# Patient Record
Sex: FEMALE | Race: BLACK OR AFRICAN AMERICAN | Employment: UNEMPLOYED | URBAN - METROPOLITAN AREA
[De-identification: names, ages, dates, MRNs, and addresses within clinical notes are randomized per-mention and may not be internally consistent; named-entity substitution may affect disease eponyms.]

---

## 2017-08-16 PROBLEM — D61.9 BONE MARROW HYPOCELLULARITY (HCC): Status: ACTIVE | Noted: 2017-08-16

## 2017-08-29 ENCOUNTER — HOSPITAL ENCOUNTER (OUTPATIENT)
Dept: LAB | Age: 52
Discharge: HOME | End: 2017-08-29
Payer: COMMERCIAL

## 2017-08-29 DIAGNOSIS — D64.9 ANEMIA, UNSPECIFIED: ICD-10-CM

## 2017-08-29 DIAGNOSIS — E55.9 VITAMIN D DEFICIENCY: ICD-10-CM

## 2017-08-29 LAB
BASOPHILS # BLD: 0 K/UL (ref 0–0.2)
BASOPHILS NFR BLD: 0 % (ref 0–2)
DIFFERENTIAL METHOD BLD: ABNORMAL
EOSINOPHIL # BLD: 0.1 K/UL (ref 0–0.8)
EOSINOPHIL NFR BLD: 2 % (ref 0.5–7.8)
ERYTHROCYTE [DISTWIDTH] IN BLOOD BY AUTOMATED COUNT: 14.3 % (ref 11.9–14.6)
FERRITIN SERPL-MCNC: 86 NG/ML (ref 8–388)
FOLATE SERPL-MCNC: 19.8 NG/ML (ref 3.1–17.5)
HAPTOGLOB SERPL-MCNC: 145 MG/DL (ref 30–200)
HCT VFR BLD AUTO: 28.9 % (ref 35.8–46.3)
HGB BLD-MCNC: 9.5 G/DL (ref 11.7–15.4)
HGB RETIC QN AUTO: 38 PG (ref 29–35)
IMM RETICS NFR: 26.5 % (ref 3–15.9)
IRON SATN MFR SERPL: 19 %
IRON SERPL-MCNC: 59 UG/DL (ref 35–150)
LDH SERPL L TO P-CCNC: 155 U/L (ref 100–190)
LYMPHOCYTES # BLD: 2.7 K/UL (ref 0.5–4.6)
LYMPHOCYTES NFR BLD: 59 % (ref 13–44)
MCH RBC QN AUTO: 34.4 PG (ref 26.1–32.9)
MCHC RBC AUTO-ENTMCNC: 32.9 G/DL (ref 31.4–35)
MCV RBC AUTO: 104.7 FL (ref 79.6–97.8)
MONOCYTES # BLD: 0.5 K/UL (ref 0.1–1.3)
MONOCYTES NFR BLD: 10 % (ref 4–12)
NEUTS SEG # BLD: 1.4 K/UL (ref 1.7–8.2)
NEUTS SEG NFR BLD: 30 % (ref 43–78)
NRBC # BLD: 0 K/UL (ref 0–0.2)
PLATELET # BLD AUTO: 134 K/UL (ref 150–450)
PMV BLD AUTO: 9.6 FL (ref 10.8–14.1)
RBC # BLD AUTO: 2.76 M/UL (ref 4.05–5.25)
RETICS # AUTO: 0.08 M/UL (ref 0.03–0.1)
RETICS/RBC NFR AUTO: 2.9 % (ref 0.3–2)
T4 FREE SERPL-MCNC: 1.2 NG/DL (ref 0.78–1.46)
TIBC SERPL-MCNC: 318 UG/DL (ref 250–450)
TSH SERPL DL<=0.005 MIU/L-ACNC: 0.3 UIU/ML (ref 0.36–3.74)
VIT B12 SERPL-MCNC: 520 PG/ML (ref 193–986)
WBC # BLD AUTO: 4.6 K/UL (ref 4.3–11.1)

## 2017-08-29 PROCEDURE — 82607 VITAMIN B-12: CPT | Performed by: INTERNAL MEDICINE

## 2017-08-29 PROCEDURE — 86334 IMMUNOFIX E-PHORESIS SERUM: CPT | Performed by: INTERNAL MEDICINE

## 2017-08-29 PROCEDURE — 83010 ASSAY OF HAPTOGLOBIN QUANT: CPT | Performed by: INTERNAL MEDICINE

## 2017-08-29 PROCEDURE — 82728 ASSAY OF FERRITIN: CPT | Performed by: INTERNAL MEDICINE

## 2017-08-29 PROCEDURE — 85046 RETICYTE/HGB CONCENTRATE: CPT | Performed by: INTERNAL MEDICINE

## 2017-08-29 PROCEDURE — 84439 ASSAY OF FREE THYROXINE: CPT | Performed by: INTERNAL MEDICINE

## 2017-08-29 PROCEDURE — 83021 HEMOGLOBIN CHROMOTOGRAPHY: CPT | Performed by: INTERNAL MEDICINE

## 2017-08-29 PROCEDURE — 36415 COLL VENOUS BLD VENIPUNCTURE: CPT | Performed by: INTERNAL MEDICINE

## 2017-08-29 PROCEDURE — 84165 PROTEIN E-PHORESIS SERUM: CPT | Performed by: INTERNAL MEDICINE

## 2017-08-29 PROCEDURE — 84443 ASSAY THYROID STIM HORMONE: CPT | Performed by: INTERNAL MEDICINE

## 2017-08-29 PROCEDURE — 83540 ASSAY OF IRON: CPT | Performed by: INTERNAL MEDICINE

## 2017-08-29 PROCEDURE — 83615 LACTATE (LD) (LDH) ENZYME: CPT | Performed by: INTERNAL MEDICINE

## 2017-08-29 PROCEDURE — 85025 COMPLETE CBC W/AUTO DIFF WBC: CPT | Performed by: INTERNAL MEDICINE

## 2017-08-29 PROCEDURE — 82746 ASSAY OF FOLIC ACID SERUM: CPT | Performed by: INTERNAL MEDICINE

## 2017-08-30 LAB
ALBUMIN SERPL ELPH-MCNC: 3.55 G/DL (ref 3.2–5.6)
ALBUMIN/GLOB SERPL: 0.8 {RATIO}
ALPHA1 GLOB SERPL ELPH-MCNC: 0.33 G/DL (ref 0.1–0.4)
ALPHA2 GLOB SERPL ELPH-MCNC: 0.88 G/DL (ref 0.4–1.2)
B-GLOBULIN SERPL QL ELPH: 1.71 G/DL (ref 0.6–1.3)
GAMMA GLOB MFR SERPL ELPH: 1.62 G/DL (ref 0.5–1.6)
HGB A MFR BLD: 94.3 % (ref 94–98)
HGB A2 MFR BLD COLUMN CHROM: 2.1 % (ref 0.7–3.1)
HGB C MFR BLD: 0 %
HGB F MFR BLD: 3.6 % (ref 0–2)
HGB FRACT BLD-IMP: ABNORMAL
HGB S BLD QL SOLY: NEGATIVE
HGB S MFR BLD: 0 %
IGA SERPL-MCNC: 456 MG/DL (ref 85–499)
IGG SERPL-MCNC: 1819 MG/DL (ref 610–1616)
IGM SERPL-MCNC: 44 MG/DL (ref 35–242)
M PROTEIN SERPL ELPH-MCNC: ABNORMAL G/DL
PROT PATTERN SERPL ELPH-IMP: ABNORMAL
PROT PATTERN SPEC IFE-IMP: ABNORMAL
PROT SERPL-MCNC: 8.1 G/DL (ref 6.3–8.2)

## 2017-09-22 ENCOUNTER — HOSPITAL ENCOUNTER (OUTPATIENT)
Dept: LAB | Age: 52
Discharge: HOME | End: 2017-09-22
Payer: COMMERCIAL

## 2017-09-22 DIAGNOSIS — D64.9 ANEMIA, UNSPECIFIED: ICD-10-CM

## 2017-09-22 LAB
ALBUMIN SERPL-MCNC: 3.4 G/DL (ref 3.5–5)
ALBUMIN/GLOB SERPL: 0.6 {RATIO} (ref 1.2–3.5)
ALP SERPL-CCNC: 69 U/L (ref 50–136)
ALT SERPL-CCNC: 18 U/L (ref 12–65)
ANION GAP SERPL CALC-SCNC: 7 MMOL/L (ref 7–16)
AST SERPL-CCNC: 17 U/L (ref 15–37)
BASOPHILS # BLD: 0 K/UL (ref 0–0.2)
BASOPHILS NFR BLD: 0 % (ref 0–2)
BILIRUB SERPL-MCNC: 0.2 MG/DL (ref 0.2–1.1)
BUN SERPL-MCNC: 16 MG/DL (ref 6–23)
CALCIUM SERPL-MCNC: 9.4 MG/DL (ref 8.3–10.4)
CHLORIDE SERPL-SCNC: 104 MMOL/L (ref 98–107)
CO2 SERPL-SCNC: 27 MMOL/L (ref 21–32)
CREAT SERPL-MCNC: 0.97 MG/DL (ref 0.6–1)
CRP SERPL-MCNC: 1.3 MG/DL (ref 0–0.9)
DIFFERENTIAL METHOD BLD: ABNORMAL
EOSINOPHIL # BLD: 0.1 K/UL (ref 0–0.8)
EOSINOPHIL NFR BLD: 2 % (ref 0.5–7.8)
ERYTHROCYTE [DISTWIDTH] IN BLOOD BY AUTOMATED COUNT: 13.5 % (ref 11.9–14.6)
ERYTHROCYTE [SEDIMENTATION RATE] IN BLOOD: 113 MM/HR (ref 0–30)
GLOBULIN SER CALC-MCNC: 5.6 G/DL (ref 2.3–3.5)
GLUCOSE SERPL-MCNC: 96 MG/DL (ref 65–100)
HCT VFR BLD AUTO: 32.9 % (ref 35.8–46.3)
HGB BLD-MCNC: 10.8 G/DL (ref 11.7–15.4)
LYMPHOCYTES # BLD: 2.5 K/UL (ref 0.5–4.6)
LYMPHOCYTES NFR BLD: 57 % (ref 13–44)
MCH RBC QN AUTO: 33.5 PG (ref 26.1–32.9)
MCHC RBC AUTO-ENTMCNC: 32.8 G/DL (ref 31.4–35)
MCV RBC AUTO: 102.2 FL (ref 79.6–97.8)
MONOCYTES # BLD: 0.4 K/UL (ref 0.1–1.3)
MONOCYTES NFR BLD: 9 % (ref 4–12)
NEUTS SEG # BLD: 1.4 K/UL (ref 1.7–8.2)
NEUTS SEG NFR BLD: 32 % (ref 43–78)
NRBC # BLD: 0 K/UL (ref 0–0.2)
PLATELET # BLD AUTO: 161 K/UL (ref 150–450)
PMV BLD AUTO: 9.2 FL (ref 10.8–14.1)
POTASSIUM SERPL-SCNC: 3.3 MMOL/L (ref 3.5–5.1)
PROT SERPL-MCNC: 9 G/DL (ref 6.3–8.2)
RBC # BLD AUTO: 3.22 M/UL (ref 4.05–5.25)
SODIUM SERPL-SCNC: 138 MMOL/L (ref 136–145)
WBC # BLD AUTO: 4.5 K/UL (ref 4.3–11.1)

## 2017-09-22 PROCEDURE — 80053 COMPREHEN METABOLIC PANEL: CPT | Performed by: INTERNAL MEDICINE

## 2017-09-22 PROCEDURE — 85025 COMPLETE CBC W/AUTO DIFF WBC: CPT | Performed by: INTERNAL MEDICINE

## 2017-09-22 PROCEDURE — 36415 COLL VENOUS BLD VENIPUNCTURE: CPT | Performed by: INTERNAL MEDICINE

## 2017-09-22 PROCEDURE — 86140 C-REACTIVE PROTEIN: CPT | Performed by: INTERNAL MEDICINE

## 2017-09-22 PROCEDURE — 85652 RBC SED RATE AUTOMATED: CPT | Performed by: INTERNAL MEDICINE

## 2017-10-06 ENCOUNTER — HOSPITAL ENCOUNTER (OUTPATIENT)
Dept: CT IMAGING | Age: 52
Discharge: HOME | End: 2017-10-06
Attending: INTERNAL MEDICINE
Payer: COMMERCIAL

## 2017-10-06 VITALS
OXYGEN SATURATION: 100 % | DIASTOLIC BLOOD PRESSURE: 89 MMHG | WEIGHT: 225 LBS | RESPIRATION RATE: 15 BRPM | TEMPERATURE: 98 F | BODY MASS INDEX: 37.49 KG/M2 | HEIGHT: 65 IN | SYSTOLIC BLOOD PRESSURE: 130 MMHG | HEART RATE: 79 BPM

## 2017-10-06 DIAGNOSIS — D64.9 ANEMIA: ICD-10-CM

## 2017-10-06 LAB — GLUCOSE BLD STRIP.AUTO-MCNC: 65 MG/DL (ref 65–100)

## 2017-10-06 PROCEDURE — 88313 SPECIAL STAINS GROUP 2: CPT | Performed by: INTERNAL MEDICINE

## 2017-10-06 PROCEDURE — 74011250636 HC RX REV CODE- 250/636

## 2017-10-06 PROCEDURE — 88312 SPECIAL STAINS GROUP 1: CPT | Performed by: INTERNAL MEDICINE

## 2017-10-06 PROCEDURE — 38221 DX BONE MARROW BIOPSIES: CPT

## 2017-10-06 PROCEDURE — 82962 GLUCOSE BLOOD TEST: CPT

## 2017-10-06 PROCEDURE — 88305 TISSUE EXAM BY PATHOLOGIST: CPT | Performed by: INTERNAL MEDICINE

## 2017-10-06 PROCEDURE — 99152 MOD SED SAME PHYS/QHP 5/>YRS: CPT

## 2017-10-06 PROCEDURE — 74011000250 HC RX REV CODE- 250: Performed by: RADIOLOGY

## 2017-10-06 PROCEDURE — 74011250636 HC RX REV CODE- 250/636: Performed by: RADIOLOGY

## 2017-10-06 PROCEDURE — 88311 DECALCIFY TISSUE: CPT | Performed by: INTERNAL MEDICINE

## 2017-10-06 RX ORDER — SODIUM CHLORIDE 9 MG/ML
150 INJECTION, SOLUTION INTRAVENOUS CONTINUOUS
Status: ACTIVE | OUTPATIENT
Start: 2017-10-06 | End: 2017-10-07

## 2017-10-06 RX ORDER — IBUPROFEN 600 MG/1
600 TABLET ORAL
Status: DISCONTINUED | OUTPATIENT
Start: 2017-10-06 | End: 2017-10-10 | Stop reason: HOSPADM

## 2017-10-06 RX ORDER — SODIUM CHLORIDE 9 MG/ML
25 INJECTION, SOLUTION INTRAVENOUS
Status: COMPLETED | OUTPATIENT
Start: 2017-10-06 | End: 2017-10-06

## 2017-10-06 RX ORDER — DIPHENHYDRAMINE HYDROCHLORIDE 50 MG/ML
50 INJECTION, SOLUTION INTRAMUSCULAR; INTRAVENOUS ONCE
Status: ACTIVE | OUTPATIENT
Start: 2017-10-06 | End: 2017-10-07

## 2017-10-06 RX ORDER — DEXTROSE 50 % IN WATER (D50W) INTRAVENOUS SYRINGE
25 ONCE
Status: COMPLETED | OUTPATIENT
Start: 2017-10-06 | End: 2017-10-06

## 2017-10-06 RX ORDER — HYDROCODONE BITARTRATE AND ACETAMINOPHEN 5; 325 MG/1; MG/1
1 TABLET ORAL
Status: DISCONTINUED | OUTPATIENT
Start: 2017-10-06 | End: 2017-10-10 | Stop reason: HOSPADM

## 2017-10-06 RX ORDER — MIDAZOLAM HYDROCHLORIDE 1 MG/ML
.5-2 INJECTION, SOLUTION INTRAMUSCULAR; INTRAVENOUS
Status: DISCONTINUED | OUTPATIENT
Start: 2017-10-06 | End: 2017-10-06

## 2017-10-06 RX ORDER — LIDOCAINE HYDROCHLORIDE 20 MG/ML
2-10 INJECTION, SOLUTION INFILTRATION; PERINEURAL
Status: COMPLETED | OUTPATIENT
Start: 2017-10-06 | End: 2017-10-06

## 2017-10-06 RX ORDER — FENTANYL CITRATE 50 UG/ML
12.5-1 INJECTION, SOLUTION INTRAMUSCULAR; INTRAVENOUS
Status: DISCONTINUED | OUTPATIENT
Start: 2017-10-06 | End: 2017-10-06

## 2017-10-06 RX ADMIN — SODIUM CHLORIDE 25 ML/HR: 9 INJECTION, SOLUTION INTRAVENOUS at 15:25

## 2017-10-06 RX ADMIN — MIDAZOLAM HYDROCHLORIDE 1 MG: 1 INJECTION, SOLUTION INTRAMUSCULAR; INTRAVENOUS at 15:25

## 2017-10-06 RX ADMIN — FENTANYL CITRATE 50 MCG: 50 INJECTION, SOLUTION INTRAMUSCULAR; INTRAVENOUS at 15:31

## 2017-10-06 RX ADMIN — FENTANYL CITRATE 50 MCG: 50 INJECTION, SOLUTION INTRAMUSCULAR; INTRAVENOUS at 15:25

## 2017-10-06 RX ADMIN — MIDAZOLAM HYDROCHLORIDE 1 MG: 1 INJECTION, SOLUTION INTRAMUSCULAR; INTRAVENOUS at 15:31

## 2017-10-06 RX ADMIN — MIDAZOLAM HYDROCHLORIDE 0.5 MG: 1 INJECTION, SOLUTION INTRAMUSCULAR; INTRAVENOUS at 15:38

## 2017-10-06 RX ADMIN — DEXTROSE MONOHYDRATE 12.5 G: 25 INJECTION, SOLUTION INTRAVENOUS at 15:02

## 2017-10-06 RX ADMIN — LIDOCAINE HYDROCHLORIDE 80 MG: 20 INJECTION, SOLUTION INFILTRATION; PERINEURAL at 15:32

## 2017-10-06 RX ADMIN — FENTANYL CITRATE 25 MCG: 50 INJECTION, SOLUTION INTRAMUSCULAR; INTRAVENOUS at 15:38

## 2017-10-06 NOTE — H&P
Department of Interventional Radiology  [phone]    History and Physical    Patient:  Elvia Antunez MRN:  [id number]  SSN:  [id number]    YOB: 1965  Age:  46 y.o. Sex:  female      Primary Care Provider:  Jamia Aranda DO  Referring Physician:  Georgina Fung MD    Subjective:     Chief Complaint: anemia    History of the Present Illness: The patient is a 46 y.o. female who presents for bone marrow biopsy. Anemia of unknown etiology. Prior biopsies have not been diagnostic. NPO since 7 AM-cheese biscuit. Past Medical History:   Diagnosis Date    Anemia, unspecified 2015    CVID (common variable immunodeficiency) ([facility]     Diagnosed in -had IVIG infusion x 3-no f/u since     Diabetes ([facility]     on meds    Essential hypertension, benign     on meds    Hx of acute pancreatitis     -Admitted to [facility] in [place] Lupus (systemic lupus erythematosus) ([facility]     Diagnosed in     Mixed hyperlipidemia     Obesity (BMI 30-39.9) 2016    Unspecified hypothyroidism 3/1/2015    on meds    Vitamin D deficiency 3/1/2015     Past Surgical History:   Procedure Laterality Date    HX  SECTION  ,     HX CHOLECYSTECTOMY      HX COLONOSCOPY  7/3/15 and     at age 43   [address]      for excessive bleeding and bso    HX OTHER SURGICAL  3/20/2015    bone marrow        Review of Systems:    Pertinent items are noted in the History of Present Illness. Current Outpatient Prescriptions   Medication Sig    lisinopril (PRINIVIL, ZESTRIL) 5 mg tablet Take 1 Tab by mouth daily.     metFORMIN ER (GLUCOPHAGE XR) 500 mg tablet 3 tabs po every day w/ supper    levothyroxine (SYNTHROID) 75 mcg tablet TAKE ONE TABLET BY MOUTH ONCE DAILY BEFORE BREAKFAST     Current Facility-Administered Medications   Medication Dose Route Frequency    midazolam (VERSED) injection 0.5-2 mg  0.5-2 mg IntraVENous Multiple  fentaNYL citrate (PF) injection 12.5-100 mcg  12.5-100 mcg IntraVENous Multiple    0.9% sodium chloride infusion  25 mL/hr IntraVENous RAD ONCE    lidocaine (XYLOCAINE) 20 mg/mL (2 %) injection  mg  2-10 mL SubCUTAneous RAD ONCE    diphenhydrAMINE (BENADRYL) injection 50 mg  50 mg IntraVENous ONCE        Allergies   Allergen Reactions    Dilaudid [Hydromorphone] Other (comments)     Stroke like symptoms       Family History   Problem Relation Age of Onset    Hypertension Mother     Heart Disease Mother      CHF   [facility] Cancer Father      lung    Diabetes Sister     Hypertension Sister     Cancer Sister 52     Breast     Social History   Substance Use Topics    Smoking status: Never Smoker    Smokeless tobacco: Never Used    Alcohol use No        Objective:       Physical Examination:    Vitals:    10/06/17 1442 10/06/17 1452   BP: 114/79    Pulse: 82    Resp: 17    Temp: 98 °F (36.7 °C)    SpO2: 100%    Weight:  102.1 kg (225 lb)   Height:  5' 5\" (1.651 m)     Blood pressure 114/79, pulse 82, temperature 98 °F (36.7 °C), resp. rate 17, height 5' 5\" (1.651 m), weight 102.1 kg (225 lb), SpO2 100 %.   HEART: regular rate and rhythm  LUNG: clear to auscultation bilaterally  ABDOMEN: normal findings: soft, non-tender, obese  EXTREMITIES: warm, trace LE edema    Laboratory:     Lab Results   Component Value Date/Time    Sodium 138 09/22/2017 08:28 AM    Sodium 141 08/11/2017 08:58 AM    Potassium 3.3 09/22/2017 08:28 AM    Potassium 3.6 08/11/2017 08:58 AM    Chloride 104 09/22/2017 08:28 AM    Chloride 101 08/11/2017 08:58 AM    CO2 27 09/22/2017 08:28 AM    CO2 22 08/11/2017 08:58 AM    Anion gap 7 09/22/2017 08:28 AM    Anion gap 9 08/11/2015 09:10 AM    Glucose 96 09/22/2017 08:28 AM    Glucose 89 08/11/2017 08:58 AM    BUN 16 09/22/2017 08:28 AM    BUN 14 08/11/2017 08:58 AM    Creatinine 0.97 09/22/2017 08:28 AM    Creatinine 0.88 08/11/2017 08:58 AM    GFR est AA >60 09/22/2017 08:28 AM    GFR est AA 87 08/11/2017 08:58 AM    GFR est non-AA >60 09/22/2017 08:28 AM    GFR est non-AA 76 08/11/2017 08:58 AM    Calcium 9.4 09/22/2017 08:28 AM    Calcium 9.7 08/11/2017 08:58 AM    Magnesium 1.9 05/05/2015 02:44 PM    Albumin 3.4 09/22/2017 08:28 AM    Albumin 3.6 08/15/2016 11:24 AM    Protein, total 9.0 09/22/2017 08:28 AM    Protein, total 8.1 08/29/2017 11:29 AM    Globulin 5.6 09/22/2017 08:28 AM    Globulin 5.1 08/11/2015 09:10 AM    A-G Ratio 0.6 09/22/2017 08:28 AM    A-G Ratio 0.8 08/29/2017 11:29 AM    AST (SGOT) 17 09/22/2017 08:28 AM    AST (SGOT) 18 08/15/2016 11:24 AM    ALT (SGPT) 18 09/22/2017 08:28 AM    ALT (SGPT) 14 08/15/2016 11:24 AM     Lab Results   Component Value Date/Time    WBC 4.5 09/22/2017 08:28 AM    WBC 4.6 08/29/2017 11:29 AM    HGB 10.8 09/22/2017 08:28 AM    HGB 9.5 08/29/2017 11:29 AM    HCT 32.9 09/22/2017 08:28 AM    HCT 28.9 08/29/2017 11:29 AM    PLATELET 397 98/75/8852 08:28 AM    PLATELET 826 32/17/3824 11:29 AM     Lab Results   Component Value Date/Time    aPTT 24.3 03/05/2015 04:18 PM    Prothrombin time 9.9 03/05/2015 04:18 PM    INR 1.0 03/05/2015 04:18 PM       Assessment:     anemia    Plan:     Planned Procedure:  Bone marrow biopsy    Risks, benefits, and alternatives reviewed with patient and she agrees to proceed with the procedure.       Signed By: Graham Calderon PA-C     October 6, 2017

## 2017-10-06 NOTE — DISCHARGE INSTRUCTIONS
[address]  Department of Interventional Radiology  [facility]  [phone] Office  [phone] Fax    BIOPSY DISCHARGE INSTRUCTIONS    General Instructions:     A biopsy is the removal of a small piece of tissue for microscopic examination or testing. Healthy tissue can be obtained for the purpose of tissue-type matching for transplants. Unhealthy tissues are more commonly biopsied to diagnose disease. Lung Biopsy:     A needle lung biopsy is performed when there is a mass discovered in the lung area. The most serious risk is infection, bleeding, and pneumothorax (a collapsed lung). Signs of pneumothorax include shortness of breath, rapid heart rate, and blueness of the skin. If any of these occur, call 911. Liver Biopsy: This test helps detect cancer, infections, and the cause of an enlargement of the liver or elevated liver enzymes. It also helps to diagnose a number of liver diseases. The pain associated with the procedure may be felt in the shoulder. The risks include internal bleeding, pneumothorax, and injury to the surrounding organs. Renal Biopsy: This procedure is sometimes done to evaluate a transplanted kidney. It is also used to evaluate unexplained decrease in kidney function, blood, or protein in the urine. You may see bright red blood in the urine the first 24 hours after the test. If the bleeding lasts longer, you need to call your doctor. There is a risk of infection and bleeding into the muscle, which may cause soreness. Spinal Biopsy: This test is sometimes done in conjunction with other procedures. Your back will be sore, as we are taking a small sample of bone, which is slightly more difficult to sample than tissue. General Biopsy:     A mass can grow in any area of the body, and we are taking a specimen as ordered by your doctor. The risks are the same.  They include bleeding, pain, and infection. Home Care Instructions: You may resume your regular diet and medication regimen. Do not drink alcohol, drive, or make any important legal decisions in the next 24 hours. Do not lift anything heavier than a gallon of milk until the soreness goes away. You may use over the counter acetaminophen or ibuprofen for the soreness. You may apply an ice pack to the affected area for 20-30 minutes at time for the first 24 hours. After that, you may apply a heat pack. Call If: You should call your Physician and/or the Radiology Nurse if you have any questions or concerns about the biopsy site. Call if you should have increased pain, fever, redness, drainage, or bleeding more than a small spot on the bandage. Follow-Up Instructions: Please see your ordering doctor as he/she has requested. To Reach Us: If you have any questions about your procedure, please call the Interventional Radiology department at [phone]. After business hours (5pm) and weekends, call the answering service at [phone] and ask for the Radiologist on call to be paged. Interventional Radiology General Nurse Discharge    After general anesthesia or intravenous sedation, for 24 hours or while taking prescription Narcotics:  · Limit your activities  · Do not drive and operate hazardous machinery  · Do not make important personal or business decisions  · Do  not drink alcoholic beverages  · If you have not urinated within 8 hours after discharge, please contact your surgeon on call. * Please give a list of your current medications to your Primary Care Provider. * Please update this list whenever your medications are discontinued, doses are     changed, or new medications (including over-the-counter products) are added. * Please carry medication information at all times in case of emergency situations.     These are general instructions for a healthy lifestyle:    No smoking/ No tobacco products/ Avoid exposure to second hand smoke  Surgeon General's Warning:  Quitting smoking now greatly reduces serious risk to your health. Obesity, smoking, and sedentary lifestyle greatly increases your risk for illness  A healthy diet, regular physical exercise & weight monitoring are important for maintaining a healthy lifestyle    You may be retaining fluid if you have a history of heart failure or if you experience any of the following symptoms:  Weight gain of 3 pounds or more overnight or 5 pounds in a week, increased swelling in our hands or feet or shortness of breath while lying flat in bed. Please call your doctor as soon as you notice any of these symptoms; do not wait until your next office visit. Recognize signs and symptoms of STROKE:  F-face looks uneven    A-arms unable to move or move unevenly    S-speech slurred or non-existent    T-time-call 911 as soon as signs and symptoms begin-DO NOT go       Back to bed or wait to see if you get better-TIME IS BRAIN. Patient Signature:  Date: 10/6/2017  Discharging Nurse:  Arlene Kerr RN

## 2017-10-06 NOTE — PROGRESS NOTES
Recovery period without difficulty. Pt alert and oriented and denies pain. Dressing is clean, dry, and intact. Reviewed discharge instructions with patient and friend, both verbalized understanding. [facility] Vital signs and Lorie score completed.

## 2017-10-06 NOTE — PROCEDURES
Interventional Radiology Brief Procedure Note    Patient: Viviane Epperson MRN: [id number]  SSN: [id number]    YOB: 1965  Age: 46 y.o. Sex: female      Date of Procedure: 10/6/2017    Pre-Procedure Diagnosis: Anemia. Post-Procedure Diagnosis: SAME    Procedure(s): Image Guided Biopsy    Brief Description of Procedure: Right iliac bone marrow aspiration and core biopsy. Performed By: Michael Clements MD     Assistants: None    Anesthesia: Moderate Sedation    Estimated Blood Loss: Less than 10ml    Specimens: Pathology    Implants: None    Findings: The aspirate clotted immediately. Two needle placements required. Complications: None    Recommendations: 1 hour bedrest.       Follow Up: Dr Joe Crawford.       Signed By: Michael Clements MD     October 6, 2017

## 2017-10-06 NOTE — IP AVS SNAPSHOT
[address] 
[phone] Patient: Zeny Jason MRN: [id number] :1965 Current Discharge Medication List  
  
ASK your doctor about these medications Dose & Instructions Dispensing Information Comments Morning Noon Evening Bedtime  
 levothyroxine 75 mcg tablet Commonly known as:  SYNTHROID Your last dose was: Your next dose is: TAKE ONE TABLET BY MOUTH ONCE DAILY BEFORE BREAKFAST Quantity:  30 Tab Refills:  2  
     
   
   
   
  
 lisinopril 5 mg tablet Commonly known as:  Rosanna Wick Your last dose was: Your next dose is:    
   
   
 Dose:  5 mg Take 1 Tab by mouth daily. Quantity:  90 Tab Refills:  2  
     
   
   
   
  
 metFORMIN  mg tablet Commonly known as:  GLUCOPHAGE XR Your last dose was: Your next dose is:    
   
   
 3 tabs po every day w/ supper Quantity:  90 Tab Refills:  2

## 2017-10-06 NOTE — IP AVS SNAPSHOT
Lauraalfie Gutierrezgulshan 
 
 
 [address] 
[phone] Patient: Sanam Davis MRN: [id number] :1965 You are allergic to the following Allergen Reactions Dilaudid (Hydromorphone) Other (comments) Stroke like symptoms Recent Documentation Height Weight BMI OB Status Smoking Status 1.651 m 102.1 kg 37.44 kg/m2 Hysterectomy Never Smoker Unresulted Labs Order Current Status BONE MARROW PREP & FLORES STAIN Collected (10/06/17 1439) CBC WITH AUTOMATED DIFF Collected (10/06/17 1440) Emergency Contacts Name Discharge Info Relation Home Work Mobile Bonny Diaz  Other Relative [6] [phone] About your hospitalization You were admitted on:  2017 You last received care in the:  [facility] RADIOLOGY CT SCAN You were discharged on:  2017 Unit phone number:  [phone] Why you were hospitalized Your primary diagnosis was:  Not on File Providers Seen During Your Hospitalizations Provider Role Specialty Primary office phone General Shaye MD Attending Provider Hematology [phone] Your Primary Care Physician (PCP) Primary Care Physician Office Phone Office Fax Theta Mcardle, [facility] [phone] Follow-up Information None Your Appointments   8:00 AM EDT Office Visit with Kranthi Carey DO  
[facility] ([address])  [place]   
[phone]   9:30 AM EDT  
LAB with [address]  
[address] [facility] OUTREACH INSURANCE 1 Rae Almazan [address] [place] [address]  
[phone]  10:00 AM EDT Follow Up with MD Coco Deshpande Hematology and Oncology [facility]) KEVIN/ Vamsi Perez [address] [place] [address]  
[phone] Saturday November 11, 2017  7:15 AM EST  
NATI MAMMO SCREENING with [facility] NATI BI ROOM 1 [address] [facility] ([address]) [address] [place] [address]  
[phone] ***** NOTE: Appointments for the Mobile Mammography UNIT CANNOT be made on My Chart *****  PATIENT ARRIVAL - Please report 30 minutes early to check in. GENERAL INSTRUCTIONS -  On the day of your exam do not use any bath powder, deodorant or lotions on the chest or armpit area. Wear two-piece outfit for ease of changing. Allow at least 1 hour for test.  
  
    
  
  
 
  
  
  
Current Discharge Medication List  
  
ASK your doctor about these medications Dose & Instructions Dispensing Information Comments Morning Noon Evening Bedtime  
 levothyroxine 75 mcg tablet Commonly known as:  SYNTHROID Your last dose was: Your next dose is: TAKE ONE TABLET BY MOUTH ONCE DAILY BEFORE BREAKFAST Quantity:  30 Tab Refills:  2  
     
   
   
   
  
 lisinopril 5 mg tablet Commonly known as:  Jaramillo Dontae Your last dose was: Your next dose is:    
   
   
 Dose:  5 mg Take 1 Tab by mouth daily. Quantity:  90 Tab Refills:  2  
     
   
   
   
  
 metFORMIN  mg tablet Commonly known as:  GLUCOPHAGE XR Your last dose was: Your next dose is:    
   
   
 3 tabs po every day w/ supper Quantity:  90 Tab Refills:  2 Discharge Instructions [facility] [address] Department of Interventional Radiology [facility] 
[phone] Office 
[phone] Fax BIOPSY DISCHARGE INSTRUCTIONS General Instructions:    A biopsy is the removal of a small piece of tissue for microscopic examination or testing. Healthy tissue can be obtained for the purpose of tissue-type matching for transplants. Unhealthy tissues are more commonly biopsied to diagnose disease. Lung Biopsy: A needle lung biopsy is performed when there is a mass discovered in the lung area. The most serious risk is infection, bleeding, and pneumothorax (a collapsed lung). Signs of pneumothorax include shortness of breath, rapid heart rate, and blueness of the skin. If any of these occur, call 911. Liver Biopsy: This test helps detect cancer, infections, and the cause of an enlargement of the liver or elevated liver enzymes. It also helps to diagnose a number of liver diseases. The pain associated with the procedure may be felt in the shoulder. The risks include internal bleeding, pneumothorax, and injury to the surrounding organs. Renal Biopsy: This procedure is sometimes done to evaluate a transplanted kidney. It is also used to evaluate unexplained decrease in kidney function, blood, or protein in the urine. You may see bright red blood in the urine the first 24 hours after the test. If the bleeding lasts longer, you need to call your doctor. There is a risk of infection and bleeding into the muscle, which may cause soreness. Spinal Biopsy: This test is sometimes done in conjunction with other procedures. Your back will be sore, as we are taking a small sample of bone, which is slightly more difficult to sample than tissue. General Biopsy: 
   A mass can grow in any area of the body, and we are taking a specimen as ordered by your doctor. The risks are the same. They include bleeding, pain, and infection. Home Care Instructions: You may resume your regular diet and medication regimen. Do not drink alcohol, drive, or make any important legal decisions in the next 24 hours. Do not lift anything heavier than a gallon of milk until the soreness goes away.  You may use over the counter acetaminophen or ibuprofen for the soreness. You may apply an ice pack to the affected area for 20-30 minutes at time for the first 24 hours. After that, you may apply a heat pack. Call If: You should call your Physician and/or the Radiology Nurse if you have any questions or concerns about the biopsy site. Call if you should have increased pain, fever, redness, drainage, or bleeding more than a small spot on the bandage. Follow-Up Instructions: Please see your ordering doctor as he/she has requested. To Reach Us: If you have any questions about your procedure, please call the Interventional Radiology department at [phone]. After business hours (5pm) and weekends, call the answering service at [phone] and ask for the Radiologist on call to be paged. Interventional Radiology General Nurse Discharge After general anesthesia or intravenous sedation, for 24 hours or while taking prescription Narcotics: · Limit your activities · Do not drive and operate hazardous machinery · Do not make important personal or business decisions · Do  not drink alcoholic beverages · If you have not urinated within 8 hours after discharge, please contact your surgeon on call. * Please give a list of your current medications to your Primary Care Provider. * Please update this list whenever your medications are discontinued, doses are 
   changed, or new medications (including over-the-counter products) are added. * Please carry medication information at all times in case of emergency situations. These are general instructions for a healthy lifestyle: No smoking/ No tobacco products/ Avoid exposure to second hand smoke Surgeon General's Warning:  Quitting smoking now greatly reduces serious risk to your health. Obesity, smoking, and sedentary lifestyle greatly increases your risk for illness A healthy diet, regular physical exercise & weight monitoring are important for maintaining a healthy lifestyle You may be retaining fluid if you have a history of heart failure or if you experience any of the following symptoms:  Weight gain of 3 pounds or more overnight or 5 pounds in a week, increased swelling in our hands or feet or shortness of breath while lying flat in bed. Please call your doctor as soon as you notice any of these symptoms; do not wait until your next office visit. Recognize signs and symptoms of STROKE: 
F-face looks uneven A-arms unable to move or move unevenly S-speech slurred or non-existent T-time-call 911 as soon as signs and symptoms begin-DO NOT go Back to bed or wait to see if you get better-TIME IS BRAIN. Patient Signature: 
Date: 10/6/2017 Discharging Nurse: Garcia Caldwell RN Discharge Orders None [facility]! Dear Jackeline Needs: Thank you for requesting a [employer] account. Our records indicate that you already have an active [employer] account. You can access your account anytime at [email or web address] Did you know that you can access your hospital and ER discharge instructions at any time in [employer]? You can also review all of your test results from your hospital stay or ER visit. Additional Information If you have questions, please visit the Frequently Asked Questions section of the [employer] website at [email or web address] Remember, [employer] is NOT to be used for urgent needs. For medical emergencies, dial 911. Now available from your iPhone and Android! General Information Please provide this summary of care documentation to your next provider. Patient Signature:  ____________________________________________________________ Date:  ____________________________________________________________  
  
Sylvia Nelson Provider Signature:  ____________________________________________________________ Date:  ____________________________________________________________

## 2017-10-17 LAB — BONE MARROW PREP & W,BMA: NORMAL

## 2017-10-26 ENCOUNTER — HOSPITAL ENCOUNTER (OUTPATIENT)
Dept: LAB | Age: 52
Discharge: HOME | End: 2017-10-26
Payer: COMMERCIAL

## 2017-10-26 DIAGNOSIS — D61.9 BONE MARROW HYPOCELLULARITY (HCC): ICD-10-CM

## 2017-10-26 LAB
ALBUMIN SERPL-MCNC: 3.5 G/DL (ref 3.5–5)
ALBUMIN/GLOB SERPL: 0.6 {RATIO} (ref 1.2–3.5)
ALP SERPL-CCNC: 74 U/L (ref 50–136)
ALT SERPL-CCNC: 18 U/L (ref 12–65)
ANION GAP SERPL CALC-SCNC: 9 MMOL/L (ref 7–16)
AST SERPL-CCNC: 19 U/L (ref 15–37)
BASOPHILS # BLD: 0 K/UL (ref 0–0.2)
BASOPHILS NFR BLD: 0 % (ref 0–2)
BILIRUB SERPL-MCNC: 0.4 MG/DL (ref 0.2–1.1)
BUN SERPL-MCNC: 17 MG/DL (ref 6–23)
CALCIUM SERPL-MCNC: 9.8 MG/DL (ref 8.3–10.4)
CHLORIDE SERPL-SCNC: 103 MMOL/L (ref 98–107)
CO2 SERPL-SCNC: 24 MMOL/L (ref 21–32)
CREAT SERPL-MCNC: 0.95 MG/DL (ref 0.6–1)
DIFFERENTIAL METHOD BLD: ABNORMAL
EOSINOPHIL # BLD: 0 K/UL (ref 0–0.8)
EOSINOPHIL NFR BLD: 1 % (ref 0.5–7.8)
ERYTHROCYTE [DISTWIDTH] IN BLOOD BY AUTOMATED COUNT: 13.6 % (ref 11.9–14.6)
GLOBULIN SER CALC-MCNC: 5.6 G/DL (ref 2.3–3.5)
GLUCOSE SERPL-MCNC: 97 MG/DL (ref 65–100)
HCT VFR BLD AUTO: 32.4 % (ref 35.8–46.3)
HGB BLD-MCNC: 11 G/DL (ref 11.7–15.4)
LYMPHOCYTES # BLD: 2.7 K/UL (ref 0.5–4.6)
LYMPHOCYTES NFR BLD: 53 % (ref 13–44)
MCH RBC QN AUTO: 34 PG (ref 26.1–32.9)
MCHC RBC AUTO-ENTMCNC: 34 G/DL (ref 31.4–35)
MCV RBC AUTO: 100 FL (ref 79.6–97.8)
MONOCYTES # BLD: 0.5 K/UL (ref 0.1–1.3)
MONOCYTES NFR BLD: 10 % (ref 4–12)
NEUTS SEG # BLD: 1.9 K/UL (ref 1.7–8.2)
NEUTS SEG NFR BLD: 36 % (ref 43–78)
NRBC # BLD: 0 K/UL (ref 0–0.2)
PLATELET # BLD AUTO: 175 K/UL (ref 150–450)
PMV BLD AUTO: 9.3 FL (ref 10.8–14.1)
POTASSIUM SERPL-SCNC: 3.9 MMOL/L (ref 3.5–5.1)
PROT SERPL-MCNC: 9.1 G/DL (ref 6.3–8.2)
RBC # BLD AUTO: 3.24 M/UL (ref 4.05–5.25)
SODIUM SERPL-SCNC: 136 MMOL/L (ref 136–145)
WBC # BLD AUTO: 5.2 K/UL (ref 4.3–11.1)

## 2017-10-26 PROCEDURE — 80053 COMPREHEN METABOLIC PANEL: CPT | Performed by: INTERNAL MEDICINE

## 2017-10-26 PROCEDURE — 85025 COMPLETE CBC W/AUTO DIFF WBC: CPT | Performed by: INTERNAL MEDICINE

## 2017-10-26 PROCEDURE — 36415 COLL VENOUS BLD VENIPUNCTURE: CPT | Performed by: INTERNAL MEDICINE

## 2017-11-11 ENCOUNTER — HOSPITAL ENCOUNTER (OUTPATIENT)
Dept: MAMMOGRAPHY | Age: 52
Discharge: HOME | End: 2017-11-11
Attending: FAMILY MEDICINE
Payer: COMMERCIAL

## 2017-11-11 DIAGNOSIS — Z00.00 ROUTINE HEALTH MAINTENANCE: ICD-10-CM

## 2017-11-11 PROCEDURE — 77067 SCR MAMMO BI INCL CAD: CPT

## 2018-04-12 ENCOUNTER — APPOINTMENT (OUTPATIENT)
Dept: GENERAL RADIOLOGY | Age: 53
End: 2018-04-12
Attending: EMERGENCY MEDICINE
Payer: SELF-PAY

## 2018-04-12 ENCOUNTER — HOSPITAL ENCOUNTER (EMERGENCY)
Age: 53
Discharge: HOME | End: 2018-04-12
Attending: EMERGENCY MEDICINE
Payer: SELF-PAY

## 2018-04-12 VITALS
WEIGHT: 235 LBS | DIASTOLIC BLOOD PRESSURE: 85 MMHG | TEMPERATURE: 97.9 F | HEART RATE: 77 BPM | HEIGHT: 65 IN | RESPIRATION RATE: 16 BRPM | BODY MASS INDEX: 39.15 KG/M2 | SYSTOLIC BLOOD PRESSURE: 131 MMHG | OXYGEN SATURATION: 100 %
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
## 2018-04-12 DIAGNOSIS — M54.2 NECK PAIN: ICD-10-CM

## 2018-04-12 DIAGNOSIS — M54.50 ACUTE MIDLINE LOW BACK PAIN WITHOUT SCIATICA: ICD-10-CM

## 2018-04-12 LAB — GLUCOSE BLD STRIP.AUTO-MCNC: 113 MG/DL (ref 65–100)

## 2018-04-12 PROCEDURE — 74011250637 HC RX REV CODE- 250/637: Performed by: EMERGENCY MEDICINE

## 2018-04-12 PROCEDURE — 99283 EMERGENCY DEPT VISIT LOW MDM: CPT | Performed by: EMERGENCY MEDICINE

## 2018-04-12 PROCEDURE — 72040 X-RAY EXAM NECK SPINE 2-3 VW: CPT

## 2018-04-12 PROCEDURE — 82962 GLUCOSE BLOOD TEST: CPT

## 2018-04-12 PROCEDURE — 72100 X-RAY EXAM L-S SPINE 2/3 VWS: CPT

## 2018-04-12 RX ORDER — NAPROXEN 375 MG/1
375 TABLET ORAL 2 TIMES DAILY WITH MEALS
Qty: 14 TAB | Refills: 0 | Status: SHIPPED | OUTPATIENT
Start: 2018-04-12 | End: 2018-04-19

## 2018-04-12 RX ORDER — CYCLOBENZAPRINE HCL 5 MG
5 TABLET ORAL 2 TIMES DAILY
Qty: 10 TAB | Refills: 0 | Status: SHIPPED | OUTPATIENT
Start: 2018-04-12 | End: 2018-04-17

## 2018-04-12 RX ORDER — KETOROLAC TROMETHAMINE 10 MG/1
10 TABLET, FILM COATED ORAL
Status: COMPLETED | OUTPATIENT
Start: 2018-04-12 | End: 2018-04-12

## 2018-04-12 RX ADMIN — KETOROLAC TROMETHAMINE 10 MG: 10 TABLET, FILM COATED ORAL at 10:49
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
## 2018-04-12 NOTE — LETTER
NOTIFICATION RETURN TO WORK / SCHOOL 
 
4/12/2018 11:38 AM 
 
Ms. Charissa Vargas [address] [place] [address] To Whom It May Concern: 
 
Charissa Vargas is currently under the care of [facility] EMERGENCY DEPT. She will return to work/school on: 4/14/18 If there are questions or concerns please have the patient contact our office. Sincerely, Alanis Morton MD
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
## 2018-11-17 ENCOUNTER — HOSPITAL ENCOUNTER (OUTPATIENT)
Dept: MAMMOGRAPHY | Age: 53
Discharge: HOME | End: 2018-11-17
Attending: INTERNAL MEDICINE
Payer: COMMERCIAL

## 2018-11-17 DIAGNOSIS — Z12.31 VISIT FOR SCREENING MAMMOGRAM: ICD-10-CM

## 2018-11-17 PROCEDURE — 77067 SCR MAMMO BI INCL CAD: CPT

## 2019-04-02 PROBLEM — E66.01 OBESITY, MORBID (HCC): Status: ACTIVE | Noted: 2019-04-02

## 2019-04-23 ENCOUNTER — HOSPITAL ENCOUNTER (OUTPATIENT)
Dept: LAB | Age: 54
Discharge: HOME | End: 2019-04-23
Payer: COMMERCIAL

## 2019-04-23 DIAGNOSIS — D61.9 BONE MARROW HYPOCELLULARITY (HCC): ICD-10-CM

## 2019-04-23 DIAGNOSIS — R53.83 FATIGUE, UNSPECIFIED TYPE: ICD-10-CM

## 2019-04-23 LAB
ALBUMIN SERPL-MCNC: 3.3 G/DL (ref 3.5–5)
ALBUMIN/GLOB SERPL: 0.6 {RATIO} (ref 1.2–3.5)
ALP SERPL-CCNC: 83 U/L (ref 50–136)
ALT SERPL-CCNC: 19 U/L (ref 12–65)
ANION GAP SERPL CALC-SCNC: 7 MMOL/L (ref 7–16)
AST SERPL-CCNC: 20 U/L (ref 15–37)
BASOPHILS # BLD: 0 K/UL (ref 0–0.2)
BASOPHILS NFR BLD: 1 % (ref 0–2)
BILIRUB SERPL-MCNC: 0.2 MG/DL (ref 0.2–1.1)
BUN SERPL-MCNC: 14 MG/DL (ref 6–23)
CALCIUM SERPL-MCNC: 9.4 MG/DL (ref 8.3–10.4)
CHLORIDE SERPL-SCNC: 107 MMOL/L (ref 98–107)
CO2 SERPL-SCNC: 26 MMOL/L (ref 21–32)
CREAT SERPL-MCNC: 0.99 MG/DL (ref 0.6–1)
DIFFERENTIAL METHOD BLD: ABNORMAL
EOSINOPHIL # BLD: 0 K/UL (ref 0–0.8)
EOSINOPHIL NFR BLD: 1 % (ref 0.5–7.8)
ERYTHROCYTE [DISTWIDTH] IN BLOOD BY AUTOMATED COUNT: 13.7 % (ref 11.9–14.6)
GLOBULIN SER CALC-MCNC: 5.2 G/DL (ref 2.3–3.5)
GLUCOSE SERPL-MCNC: 105 MG/DL (ref 65–100)
HCT VFR BLD AUTO: 33.3 % (ref 35.8–46.3)
HGB BLD-MCNC: 10.8 G/DL (ref 11.7–15.4)
IMM GRANULOCYTES # BLD AUTO: 0 K/UL (ref 0–0.5)
IMM GRANULOCYTES NFR BLD AUTO: 1 % (ref 0–5)
LYMPHOCYTES # BLD: 2.5 K/UL (ref 0.5–4.6)
LYMPHOCYTES NFR BLD: 58 % (ref 13–44)
MCH RBC QN AUTO: 32.3 PG (ref 26.1–32.9)
MCHC RBC AUTO-ENTMCNC: 32.4 G/DL (ref 31.4–35)
MCV RBC AUTO: 99.7 FL (ref 79.6–97.8)
MONOCYTES # BLD: 0.4 K/UL (ref 0.1–1.3)
MONOCYTES NFR BLD: 9 % (ref 4–12)
NEUTS SEG # BLD: 1.4 K/UL (ref 1.7–8.2)
NEUTS SEG NFR BLD: 31 % (ref 43–78)
NRBC # BLD: 0 K/UL (ref 0–0.2)
PLATELET # BLD AUTO: 162 K/UL (ref 150–450)
PMV BLD AUTO: 9.4 FL (ref 9.4–12.3)
POTASSIUM SERPL-SCNC: 3.7 MMOL/L (ref 3.5–5.1)
PROT SERPL-MCNC: 8.5 G/DL (ref 6.3–8.2)
RBC # BLD AUTO: 3.34 M/UL (ref 4.05–5.25)
SODIUM SERPL-SCNC: 140 MMOL/L (ref 136–145)
T4 FREE SERPL-MCNC: 0.8 NG/DL (ref 0.78–1.46)
WBC # BLD AUTO: 4.4 K/UL (ref 4.3–11.1)

## 2019-04-23 PROCEDURE — 80053 COMPREHEN METABOLIC PANEL: CPT

## 2019-04-23 PROCEDURE — 83921 ORGANIC ACID SINGLE QUANT: CPT

## 2019-04-23 PROCEDURE — 88184 FLOWCYTOMETRY/ TC 1 MARKER: CPT

## 2019-04-23 PROCEDURE — 85025 COMPLETE CBC W/AUTO DIFF WBC: CPT

## 2019-04-23 PROCEDURE — 84439 ASSAY OF FREE THYROXINE: CPT

## 2019-04-23 PROCEDURE — 36415 COLL VENOUS BLD VENIPUNCTURE: CPT

## 2019-04-23 PROCEDURE — 83090 ASSAY OF HOMOCYSTEINE: CPT

## 2019-04-23 PROCEDURE — 88185 FLOWCYTOMETRY/TC ADD-ON: CPT

## 2019-04-24 LAB — HCYS SERPL-SCNC: 8.6 UMOL/L (ref 0–15)

## 2019-04-26 LAB
Lab: NORMAL
METHYLMALONATE SERPL-SCNC: 137 NMOL/L (ref 0–378)

## 2019-04-29 PROBLEM — R03.0 BLOOD PRESSURE ELEVATED WITHOUT HISTORY OF HTN: Status: ACTIVE | Noted: 2019-04-29

## 2019-04-29 PROBLEM — Z00.00 PHYSICAL EXAM, ANNUAL: Status: ACTIVE | Noted: 2019-04-29

## 2019-04-29 PROBLEM — Z13.820 OSTEOPOROSIS SCREENING: Status: ACTIVE | Noted: 2019-04-29

## 2019-04-29 PROBLEM — Z23 ENCOUNTER FOR IMMUNIZATION: Status: ACTIVE | Noted: 2019-04-29

## 2019-04-29 PROBLEM — E66.01 MORBID OBESITY (HCC): Status: ACTIVE | Noted: 2019-04-29

## 2019-04-29 PROBLEM — Z12.39 BREAST CANCER SCREENING: Status: ACTIVE | Noted: 2019-04-29

## 2019-04-29 LAB
FLOW CYTOMETRY, FBTC1: NORMAL
SPECIMEN SOURCE: NORMAL
TEST ORDERED:: NORMAL

## 2019-05-23 ENCOUNTER — HOSPITAL ENCOUNTER (OUTPATIENT)
Dept: LAB | Age: 54
Discharge: HOME | End: 2019-05-23
Payer: COMMERCIAL

## 2019-05-23 DIAGNOSIS — D51.9 ANEMIA DUE TO VITAMIN B12 DEFICIENCY, UNSPECIFIED B12 DEFICIENCY TYPE: ICD-10-CM

## 2019-05-23 LAB
ALBUMIN SERPL-MCNC: 3.3 G/DL (ref 3.5–5)
ALBUMIN/GLOB SERPL: 0.7 {RATIO} (ref 1.2–3.5)
ALP SERPL-CCNC: 73 U/L (ref 50–136)
ALT SERPL-CCNC: 23 U/L (ref 12–65)
ANION GAP SERPL CALC-SCNC: 9 MMOL/L (ref 7–16)
AST SERPL-CCNC: 26 U/L (ref 15–37)
BASOPHILS # BLD: 0 K/UL (ref 0–0.2)
BASOPHILS NFR BLD: 0 % (ref 0–2)
BILIRUB SERPL-MCNC: 0.2 MG/DL (ref 0.2–1.1)
BUN SERPL-MCNC: 15 MG/DL (ref 6–23)
CALCIUM SERPL-MCNC: 9.3 MG/DL (ref 8.3–10.4)
CHLORIDE SERPL-SCNC: 107 MMOL/L (ref 98–107)
CO2 SERPL-SCNC: 22 MMOL/L (ref 21–32)
CREAT SERPL-MCNC: 1.08 MG/DL (ref 0.6–1)
DIFFERENTIAL METHOD BLD: ABNORMAL
EOSINOPHIL # BLD: 0.1 K/UL (ref 0–0.8)
EOSINOPHIL NFR BLD: 1 % (ref 0.5–7.8)
ERYTHROCYTE [DISTWIDTH] IN BLOOD BY AUTOMATED COUNT: 14.6 % (ref 11.9–14.6)
FERRITIN SERPL-MCNC: 71 NG/ML (ref 8–388)
GLOBULIN SER CALC-MCNC: 5 G/DL (ref 2.3–3.5)
GLUCOSE SERPL-MCNC: 101 MG/DL (ref 65–100)
HCT VFR BLD AUTO: 33.1 % (ref 35.8–46.3)
HGB BLD-MCNC: 11 G/DL (ref 11.7–15.4)
IMM GRANULOCYTES # BLD AUTO: 0 K/UL (ref 0–0.5)
IMM GRANULOCYTES NFR BLD AUTO: 0 % (ref 0–5)
IRON SATN MFR SERPL: 40 %
IRON SERPL-MCNC: 129 UG/DL (ref 35–150)
LYMPHOCYTES # BLD: 2.7 K/UL (ref 0.5–4.6)
LYMPHOCYTES NFR BLD: 56 % (ref 13–44)
MCH RBC QN AUTO: 33 PG (ref 26.1–32.9)
MCHC RBC AUTO-ENTMCNC: 33.2 G/DL (ref 31.4–35)
MCV RBC AUTO: 99.4 FL (ref 79.6–97.8)
MONOCYTES # BLD: 0.3 K/UL (ref 0.1–1.3)
MONOCYTES NFR BLD: 7 % (ref 4–12)
NEUTS SEG # BLD: 1.8 K/UL (ref 1.7–8.2)
NEUTS SEG NFR BLD: 36 % (ref 43–78)
NRBC # BLD: 0 K/UL (ref 0–0.2)
PLATELET # BLD AUTO: 151 K/UL (ref 150–450)
PMV BLD AUTO: 9.1 FL (ref 9.4–12.3)
POTASSIUM SERPL-SCNC: 4 MMOL/L (ref 3.5–5.1)
PROT SERPL-MCNC: 8.3 G/DL (ref 6.3–8.2)
RBC # BLD AUTO: 3.33 M/UL (ref 4.05–5.25)
SODIUM SERPL-SCNC: 138 MMOL/L (ref 136–145)
TIBC SERPL-MCNC: 320 UG/DL (ref 250–450)
WBC # BLD AUTO: 4.9 K/UL (ref 4.3–11.1)

## 2019-05-23 PROCEDURE — 82728 ASSAY OF FERRITIN: CPT

## 2019-05-23 PROCEDURE — 85025 COMPLETE CBC W/AUTO DIFF WBC: CPT

## 2019-05-23 PROCEDURE — 80053 COMPREHEN METABOLIC PANEL: CPT

## 2019-05-23 PROCEDURE — 36415 COLL VENOUS BLD VENIPUNCTURE: CPT

## 2019-05-23 PROCEDURE — 83540 ASSAY OF IRON: CPT

## 2019-09-20 PROBLEM — Z13.820 OSTEOPOROSIS SCREENING: Status: RESOLVED | Noted: 2019-04-29 | Resolved: 2019-09-20

## 2019-09-20 PROBLEM — Z12.39 BREAST CANCER SCREENING: Status: RESOLVED | Noted: 2019-04-29 | Resolved: 2019-09-20

## 2019-09-20 PROBLEM — Z23 ENCOUNTER FOR IMMUNIZATION: Status: RESOLVED | Noted: 2019-04-29 | Resolved: 2019-09-20

## 2019-09-20 PROBLEM — Z00.00 PHYSICAL EXAM, ANNUAL: Status: RESOLVED | Noted: 2019-04-29 | Resolved: 2019-09-20

## 2019-10-25 ENCOUNTER — APPOINTMENT (OUTPATIENT)
Dept: GENERAL RADIOLOGY | Age: 54
End: 2019-10-25
Attending: EMERGENCY MEDICINE
Payer: COMMERCIAL

## 2019-10-25 ENCOUNTER — HOSPITAL ENCOUNTER (EMERGENCY)
Age: 54
Discharge: HOME | End: 2019-10-25
Attending: EMERGENCY MEDICINE
Payer: COMMERCIAL

## 2019-10-25 VITALS
BODY MASS INDEX: 38.14 KG/M2 | OXYGEN SATURATION: 97 % | TEMPERATURE: 97.8 F | DIASTOLIC BLOOD PRESSURE: 98 MMHG | HEART RATE: 72 BPM | RESPIRATION RATE: 25 BRPM | HEIGHT: 67 IN | SYSTOLIC BLOOD PRESSURE: 169 MMHG | WEIGHT: 243 LBS

## 2019-10-25 DIAGNOSIS — R07.89 ATYPICAL CHEST PAIN: Primary | ICD-10-CM

## 2019-10-25 LAB
ALBUMIN SERPL-MCNC: 3 G/DL (ref 3.5–5)
ALBUMIN/GLOB SERPL: 0.7 {RATIO} (ref 1.2–3.5)
ALP SERPL-CCNC: 73 U/L (ref 50–136)
ALT SERPL-CCNC: 22 U/L (ref 12–65)
ANION GAP SERPL CALC-SCNC: 9 MMOL/L (ref 7–16)
AST SERPL-CCNC: 37 U/L (ref 15–37)
BASOPHILS # BLD: 0 K/UL (ref 0–0.2)
BASOPHILS NFR BLD: 0 % (ref 0–2)
BILIRUB SERPL-MCNC: 0.2 MG/DL (ref 0.2–1.1)
BUN SERPL-MCNC: 12 MG/DL (ref 6–23)
CALCIUM SERPL-MCNC: 9.4 MG/DL (ref 8.3–10.4)
CHLORIDE SERPL-SCNC: 104 MMOL/L (ref 98–107)
CO2 SERPL-SCNC: 25 MMOL/L (ref 21–32)
CREAT SERPL-MCNC: 0.87 MG/DL (ref 0.6–1)
DIFFERENTIAL METHOD BLD: ABNORMAL
EOSINOPHIL # BLD: 0 K/UL (ref 0–0.8)
EOSINOPHIL NFR BLD: 0 % (ref 0.5–7.8)
ERYTHROCYTE [DISTWIDTH] IN BLOOD BY AUTOMATED COUNT: 13.2 % (ref 11.9–14.6)
GLOBULIN SER CALC-MCNC: 4.6 G/DL (ref 2.3–3.5)
GLUCOSE SERPL-MCNC: 107 MG/DL (ref 65–100)
HCT VFR BLD AUTO: 34.6 % (ref 35.8–46.3)
HGB BLD-MCNC: 11.5 G/DL (ref 11.7–15.4)
IMM GRANULOCYTES # BLD AUTO: 0 K/UL (ref 0–0.5)
IMM GRANULOCYTES NFR BLD AUTO: 0 % (ref 0–5)
LIPASE SERPL-CCNC: 136 U/L (ref 73–393)
LYMPHOCYTES # BLD: 3.3 K/UL (ref 0.5–4.6)
LYMPHOCYTES NFR BLD: 59 % (ref 13–44)
MCH RBC QN AUTO: 34.3 PG (ref 26.1–32.9)
MCHC RBC AUTO-ENTMCNC: 33.2 G/DL (ref 31.4–35)
MCV RBC AUTO: 103.3 FL (ref 79.6–97.8)
MONOCYTES # BLD: 0.5 K/UL (ref 0.1–1.3)
MONOCYTES NFR BLD: 8 % (ref 4–12)
NEUTS SEG # BLD: 1.8 K/UL (ref 1.7–8.2)
NEUTS SEG NFR BLD: 32 % (ref 43–78)
NRBC # BLD: 0 K/UL (ref 0–0.2)
PLATELET # BLD AUTO: 159 K/UL (ref 150–450)
PMV BLD AUTO: 10.2 FL (ref 9.4–12.3)
POTASSIUM SERPL-SCNC: 4.1 MMOL/L (ref 3.5–5.1)
PROT SERPL-MCNC: 7.6 G/DL (ref 6.3–8.2)
RBC # BLD AUTO: 3.35 M/UL (ref 4.05–5.2)
SODIUM SERPL-SCNC: 138 MMOL/L (ref 136–145)
TROPONIN I BLD-MCNC: 0 NG/ML (ref 0.02–0.05)
TROPONIN I BLD-MCNC: 0 NG/ML (ref 0.02–0.05)
WBC # BLD AUTO: 5.7 K/UL (ref 4.3–11.1)

## 2019-10-25 PROCEDURE — 83690 ASSAY OF LIPASE: CPT

## 2019-10-25 PROCEDURE — 99285 EMERGENCY DEPT VISIT HI MDM: CPT | Performed by: EMERGENCY MEDICINE

## 2019-10-25 PROCEDURE — 84484 ASSAY OF TROPONIN QUANT: CPT

## 2019-10-25 PROCEDURE — 85025 COMPLETE CBC W/AUTO DIFF WBC: CPT

## 2019-10-25 PROCEDURE — 71045 X-RAY EXAM CHEST 1 VIEW: CPT

## 2019-10-25 PROCEDURE — 80053 COMPREHEN METABOLIC PANEL: CPT

## 2019-10-25 PROCEDURE — 93005 ELECTROCARDIOGRAM TRACING: CPT | Performed by: EMERGENCY MEDICINE
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
## 2019-10-25 NOTE — ROUTINE PROCESS
I have reviewed discharge instructions with the patient. The patient verbalized understanding. Patient left ED via Discharge Method: ambulatory to Home with self. Opportunity for questions and clarification provided. Patient given 0 scripts. To continue your aftercare when you leave the hospital, you may receive an automated call from our care team to check in on how you are doing. This is a free service and part of our promise to provide the best care and service to meet your aftercare needs.  If you have questions, or wish to unsubscribe from this service please call [phone]. Thank you for Choosing our [facility] Emergency Department.

## 2019-10-31 PROBLEM — R00.2 PALPITATIONS: Status: ACTIVE | Noted: 2019-10-31

## 2019-10-31 PROBLEM — R07.89 CHEST DISCOMFORT: Status: ACTIVE | Noted: 2019-10-31

## 2019-11-05 LAB
CALCULATED P AXIS, ECG09: 40 DEGREES
CALCULATED R AXIS, ECG10: 36 DEGREES
CALCULATED T AXIS, ECG11: 39 DEGREES
DIAGNOSIS, 93000: NORMAL
P-R INTERVAL, ECG05: 182 MS
Q-T INTERVAL, ECG07: 380 MS
QTC CALCULATION (BEZET), ECG08: 416 MS
VENTRICULAR RATE, ECG03: 72 BPM

## 2019-11-09 ENCOUNTER — HOSPITAL ENCOUNTER (OUTPATIENT)
Dept: CT IMAGING | Age: 54
Discharge: HOME | End: 2019-11-09
Attending: INTERNAL MEDICINE
Payer: SELF-PAY

## 2019-11-09 DIAGNOSIS — R07.89 CHEST DISCOMFORT: ICD-10-CM

## 2019-11-09 DIAGNOSIS — I10 ESSENTIAL HYPERTENSION, BENIGN: ICD-10-CM

## 2019-11-09 DIAGNOSIS — E78.2 MIXED HYPERLIPIDEMIA: ICD-10-CM

## 2019-11-09 PROCEDURE — 75571 CT HRT W/O DYE W/CA TEST: CPT

## 2019-11-23 ENCOUNTER — HOSPITAL ENCOUNTER (OUTPATIENT)
Dept: MAMMOGRAPHY | Age: 54
Discharge: HOME | End: 2019-11-23
Attending: FAMILY MEDICINE
Payer: COMMERCIAL

## 2019-11-23 DIAGNOSIS — Z12.31 ENCOUNTER FOR SCREENING MAMMOGRAM FOR MALIGNANT NEOPLASM OF BREAST: ICD-10-CM

## 2019-11-23 PROCEDURE — 77067 SCR MAMMO BI INCL CAD: CPT

## 2019-12-02 PROBLEM — R93.1 AGATSTON CORONARY ARTERY CALCIUM SCORE BETWEEN 200 AND 399: Status: ACTIVE | Noted: 2019-12-02

## 2019-12-12 PROBLEM — F33.2 DEPRESSION, MAJOR, SEVERE RECURRENCE (HCC): Status: ACTIVE | Noted: 2019-12-12

## 2020-06-10 PROBLEM — F41.9 ANXIETY: Status: ACTIVE | Noted: 2020-06-10

## 2020-06-10 PROBLEM — F51.04 PSYCHOPHYSIOLOGICAL INSOMNIA: Status: ACTIVE | Noted: 2020-06-10

## 2020-06-10 PROBLEM — Z63.4 GRIEF AT LOSS OF CHILD: Status: ACTIVE | Noted: 2020-06-10

## 2020-06-10 PROBLEM — F43.21 GRIEF AT LOSS OF CHILD: Status: ACTIVE | Noted: 2020-06-10

## 2020-10-16 ENCOUNTER — TRANSCRIBE ORDER (OUTPATIENT)
Dept: SCHEDULING | Age: 55
End: 2020-10-16

## 2020-10-16 DIAGNOSIS — Z12.31 SCREENING MAMMOGRAM, ENCOUNTER FOR: Primary | ICD-10-CM

## 2020-11-03 ENCOUNTER — APPOINTMENT (OUTPATIENT)
Dept: GENERAL RADIOLOGY | Age: 55
End: 2020-11-03
Attending: STUDENT IN AN ORGANIZED HEALTH CARE EDUCATION/TRAINING PROGRAM
Payer: COMMERCIAL

## 2020-11-03 ENCOUNTER — HOSPITAL ENCOUNTER (EMERGENCY)
Age: 55
Discharge: ANOTHER HOSPITAL | End: 2020-11-04
Attending: STUDENT IN AN ORGANIZED HEALTH CARE EDUCATION/TRAINING PROGRAM
Payer: COMMERCIAL

## 2020-11-03 ENCOUNTER — HOSPITAL ENCOUNTER (OUTPATIENT)
Dept: CT IMAGING | Age: 55
Discharge: HOME | End: 2020-11-03
Attending: STUDENT IN AN ORGANIZED HEALTH CARE EDUCATION/TRAINING PROGRAM

## 2020-11-03 DIAGNOSIS — N17.9 AKI (ACUTE KIDNEY INJURY) (HCC): ICD-10-CM

## 2020-11-03 DIAGNOSIS — J18.9 PNEUMONIA DUE TO INFECTIOUS ORGANISM, UNSPECIFIED LATERALITY, UNSPECIFIED PART OF LUNG: Primary | ICD-10-CM

## 2020-11-03 LAB
ALBUMIN SERPL-MCNC: 2.9 G/DL (ref 3.5–5)
ALBUMIN/GLOB SERPL: 0.5 {RATIO} (ref 1.2–3.5)
ALP SERPL-CCNC: 86 U/L (ref 50–136)
ALT SERPL-CCNC: 101 U/L (ref 12–65)
ANION GAP SERPL CALC-SCNC: 13 MMOL/L (ref 7–16)
APTT PPP: 31.2 SEC (ref 24.1–35.1)
AST SERPL-CCNC: 256 U/L (ref 15–37)
BASOPHILS # BLD: 0.1 K/UL (ref 0–0.2)
BASOPHILS NFR BLD: 1 % (ref 0–2)
BILIRUB SERPL-MCNC: 0.6 MG/DL (ref 0.2–1.1)
BUN SERPL-MCNC: 42 MG/DL (ref 6–23)
CALCIUM SERPL-MCNC: 8.7 MG/DL (ref 8.3–10.4)
CHLORIDE SERPL-SCNC: 105 MMOL/L (ref 98–107)
CO2 SERPL-SCNC: 20 MMOL/L (ref 21–32)
CREAT SERPL-MCNC: 2.11 MG/DL (ref 0.6–1)
D DIMER PPP FEU-MCNC: 3.53 UG/ML(FEU)
DIFFERENTIAL METHOD BLD: ABNORMAL
EOSINOPHIL # BLD: 0.2 K/UL (ref 0–0.8)
EOSINOPHIL NFR BLD: 2 % (ref 0.5–7.8)
ERYTHROCYTE [DISTWIDTH] IN BLOOD BY AUTOMATED COUNT: 15.2 % (ref 11.9–14.6)
GLOBULIN SER CALC-MCNC: 6.2 G/DL (ref 2.3–3.5)
GLUCOSE SERPL-MCNC: 116 MG/DL (ref 65–100)
HCT VFR BLD AUTO: 39.7 % (ref 35.8–46.3)
HGB BLD-MCNC: 13.3 G/DL (ref 11.7–15.4)
IMM GRANULOCYTES # BLD AUTO: 0.2 K/UL (ref 0–0.5)
IMM GRANULOCYTES NFR BLD AUTO: 2 % (ref 0–5)
INR PPP: 1
LACTATE SERPL-SCNC: 3.1 MMOL/L (ref 0.4–2)
LYMPHOCYTES # BLD: 1.8 K/UL (ref 0.5–4.6)
LYMPHOCYTES NFR BLD: 18 % (ref 13–44)
MCH RBC QN AUTO: 33.8 PG (ref 26.1–32.9)
MCHC RBC AUTO-ENTMCNC: 33.5 G/DL (ref 31.4–35)
MCV RBC AUTO: 100.8 FL (ref 79.6–97.8)
MONOCYTES # BLD: 0.4 K/UL (ref 0.1–1.3)
MONOCYTES NFR BLD: 4 % (ref 4–12)
NEUTS SEG # BLD: 7.1 K/UL (ref 1.7–8.2)
NEUTS SEG NFR BLD: 73 % (ref 43–78)
NRBC # BLD: 0 K/UL (ref 0–0.2)
PLATELET # BLD AUTO: 76 K/UL (ref 150–450)
PLATELET COMMENTS,PCOM: ABNORMAL
PMV BLD AUTO: 13.1 FL (ref 9.4–12.3)
POTASSIUM SERPL-SCNC: 4.1 MMOL/L (ref 3.5–5.1)
PROT SERPL-MCNC: 9.1 G/DL (ref 6.3–8.2)
PROTHROMBIN TIME: 13.7 SEC (ref 12.5–14.7)
RBC # BLD AUTO: 3.94 M/UL (ref 4.05–5.2)
RBC MORPH BLD: ABNORMAL
SODIUM SERPL-SCNC: 138 MMOL/L (ref 136–145)
TROPONIN-HIGH SENSITIVITY: 19.3 PG/ML (ref 0–14)
TROPONIN-HIGH SENSITIVITY: 22.1 PG/ML (ref 0–14)
TSH SERPL DL<=0.005 MIU/L-ACNC: 3.12 UIU/ML
WBC # BLD AUTO: 9.8 K/UL (ref 4.3–11.1)
WBC MORPH BLD: ABNORMAL

## 2020-11-03 PROCEDURE — 96361 HYDRATE IV INFUSION ADD-ON: CPT

## 2020-11-03 PROCEDURE — 74011000636 HC RX REV CODE- 636

## 2020-11-03 PROCEDURE — 71260 CT THORAX DX C+: CPT

## 2020-11-03 PROCEDURE — 85379 FIBRIN DEGRADATION QUANT: CPT

## 2020-11-03 PROCEDURE — 84443 ASSAY THYROID STIM HORMONE: CPT

## 2020-11-03 PROCEDURE — 74011000258 HC RX REV CODE- 258

## 2020-11-03 PROCEDURE — 99284 EMERGENCY DEPT VISIT MOD MDM: CPT

## 2020-11-03 PROCEDURE — 96360 HYDRATION IV INFUSION INIT: CPT

## 2020-11-03 PROCEDURE — 80053 COMPREHEN METABOLIC PANEL: CPT

## 2020-11-03 PROCEDURE — 85730 THROMBOPLASTIN TIME PARTIAL: CPT

## 2020-11-03 PROCEDURE — 84484 ASSAY OF TROPONIN QUANT: CPT

## 2020-11-03 PROCEDURE — 74011250636 HC RX REV CODE- 250/636: Performed by: EMERGENCY MEDICINE

## 2020-11-03 PROCEDURE — 71045 X-RAY EXAM CHEST 1 VIEW: CPT

## 2020-11-03 PROCEDURE — 85025 COMPLETE CBC W/AUTO DIFF WBC: CPT

## 2020-11-03 PROCEDURE — 85610 PROTHROMBIN TIME: CPT

## 2020-11-03 PROCEDURE — 87635 SARS-COV-2 COVID-19 AMP PRB: CPT

## 2020-11-03 PROCEDURE — 83605 ASSAY OF LACTIC ACID: CPT

## 2020-11-03 PROCEDURE — 74011250636 HC RX REV CODE- 250/636: Performed by: STUDENT IN AN ORGANIZED HEALTH CARE EDUCATION/TRAINING PROGRAM

## 2020-11-03 RX ORDER — ONDANSETRON 4 MG/1
4 TABLET, ORALLY DISINTEGRATING ORAL
Qty: 16 TAB | Refills: 0 | Status: SHIPPED | OUTPATIENT
Start: 2020-11-03 | End: 2021-05-03 | Stop reason: ALTCHOICE

## 2020-11-03 RX ORDER — DOXYCYCLINE HYCLATE 100 MG
100 TABLET ORAL 2 TIMES DAILY
Qty: 14 TAB | Refills: 0 | Status: SHIPPED | OUTPATIENT
Start: 2020-11-03 | End: 2020-11-10

## 2020-11-03 RX ADMIN — SODIUM CHLORIDE 500 ML: 900 INJECTION, SOLUTION INTRAVENOUS at 19:28

## 2020-11-03 RX ADMIN — SODIUM CHLORIDE 1000 ML: 900 INJECTION, SOLUTION INTRAVENOUS at 23:27

## 2020-11-03 NOTE — ED TRIAGE NOTES
Pt states she has had a productive cough, lethargy, and sinus drainage for several weeks. States she is being treated for a UTI at this time. Mask in use.

## 2020-11-03 NOTE — Clinical Note
[address] [address] EMERGENCY DEPT 
[address] 
[facility] [id number] [phone] Work/School Note Date: 11/3/2020 To Whom It May concern: 
 
Danial Gracia was seen and treated today in the emergency room by the following provider(s): 
Attending Provider: Loren Man MD.   
 
Danial Gracia is excused from work/school on 11/3/2020 through 11/6/2020. She is medically clear to return to work/school on 11/7/2020. Sincerely, Marino Faulkner MD
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
## 2020-11-04 ENCOUNTER — PATIENT OUTREACH (OUTPATIENT)
Dept: CASE MANAGEMENT | Age: 55
End: 2020-11-04

## 2020-11-04 VITALS
DIASTOLIC BLOOD PRESSURE: 88 MMHG | BODY MASS INDEX: 43.32 KG/M2 | TEMPERATURE: 98.5 F | SYSTOLIC BLOOD PRESSURE: 135 MMHG | WEIGHT: 260 LBS | HEIGHT: 65 IN | HEART RATE: 88 BPM | RESPIRATION RATE: 20 BRPM | OXYGEN SATURATION: 97 %

## 2020-11-04 LAB
SARS COV-2, XPGCVT: NEGATIVE
SOURCE, COVRS: NORMAL

## 2020-11-04 PROCEDURE — 96361 HYDRATE IV INFUSION ADD-ON: CPT

## 2020-11-04 NOTE — ED NOTES
I have reviewed discharge instructions with the patient. The patient verbalized understanding. Patient left ED via Discharge Method: ambulatory to Home with self). Opportunity for questions and clarification provided. Patient given 2 scripts. To continue your aftercare when you leave the hospital, you may receive an automated call from our care team to check in on how you are doing. This is a free service and part of our promise to provide the best care and service to meet your aftercare needs.  If you have questions, or wish to unsubscribe from this service please call [phone]. Thank you for Choosing our [facility] Emergency Department.

## 2020-11-04 NOTE — ED NOTES
Patient is been transfer to [facility] for CT , DT ER charge nurse is aware that the the patient is on the way to the facility. Dr Zohra Irizarry who is an ER doctor too is aware .

## 2020-11-04 NOTE — DISCHARGE INSTRUCTIONS
Drink plenty of fluids over the next few days    Take antibiotics as directed    Follow-up with your primary care physician within 1 to 2 days for recheck    Return to the emergency department for worsening shortness of breath, worsening cough, episodes of passing out, worsening weakness, or other symptoms that concern you

## 2020-11-04 NOTE — PROGRESS NOTES
Patient contacted regarding recent discharge and COVID-19 risk. Discussed COVID-19 related testing which was pending at this time. Test results were pending. Patient informed of results, if available? Pt aware test is pending    Care Transition Nurse/ Ambulatory Care Manager/ LPN Care Coordinator contacted the patient by telephone to perform post discharge assessment. Verified name and  with patient as identifiers. Patient has following risk factors of: pneumonia and HTN. CTN/ACM/LPN reviewed discharge instructions, medical action plan and red flags related to discharge diagnosis. Reviewed and educated them on any new and changed medications related to discharge diagnosis. Advised obtaining a 90-day supply of all daily and as-needed medications. Advance Care Planning:   Does patient have an Advance Directive: health care decision makers updated    Education provided regarding infection prevention, and signs and symptoms of COVID-19 and when to seek medical attention with patient who verbalized understanding. Discussed exposure protocols and quarantine from [address] you at higher risk for severe illness  and given an opportunity for questions and concerns. The patient agrees to contact the COVID-19 hotline [phone] or PCP office for questions related to their healthcare. CTN/ACM/LPN provided contact information for future reference. From CDC: Are you at higher risk for severe illness?  Wash your hands often.  Avoid close contact (6 feet, which is about two arm lengths) with people who are sick.  Put distance between yourself and other people if COVID-19 is spreading in your community.  Clean and disinfect frequently touched surfaces.  Avoid all cruise travel and non-essential air travel.  Call your healthcare professional if you have concerns about COVID-19 and your underlying condition or if you are sick.     For more information on steps you can take to protect yourself, see CDC's How to Protect Yourself      Patient/family/caregiver given information for GetWell Loop and agrees to enroll yes  Patient's preferred e-mail:  [email or web address]  Patient's preferred phone number: [phone]  Based on Loop alert triggers, patient will be contacted by nurse care manager for worsening symptoms. Pt will be further monitored by COVID Loop Team based on severity of symptoms and risk factors. Reviewed ED RXs, pt taking. Encouraged good po intake and PCP f/u, either in person or Virtual depending on COVID test.   Pt appreciative if outreach.

## 2020-11-04 NOTE — ED NOTES
Recent Results (from the past 12 hour(s))   CBC WITH AUTOMATED DIFF    Collection Time: 11/03/20  6:14 PM   Result Value Ref Range    WBC 9.8 4.3 - 11.1 K/uL    RBC 3.94 (L) 4.05 - 5.2 M/uL    HGB 13.3 11.7 - 15.4 g/dL    HCT 39.7 35.8 - 46.3 %    .8 (H) 79.6 - 97.8 FL    MCH 33.8 (H) 26.1 - 32.9 PG    MCHC 33.5 31.4 - 35.0 g/dL    RDW 15.2 (H) 11.9 - 14.6 %    PLATELET 76 (L) 935 - 450 K/uL    MPV 13.1 (H) 9.4 - 12.3 FL    ABSOLUTE NRBC 0.00 0.0 - 0.2 K/uL    NEUTROPHILS 73 43 - 78 %    LYMPHOCYTES 18 13 - 44 %    MONOCYTES 4 4.0 - 12.0 %    EOSINOPHILS 2 0.5 - 7.8 %    BASOPHILS 1 0.0 - 2.0 %    IMMATURE GRANULOCYTES 2 0.0 - 5.0 %    ABS. NEUTROPHILS 7.1 1.7 - 8.2 K/UL    ABS. LYMPHOCYTES 1.8 0.5 - 4.6 K/UL    ABS. MONOCYTES 0.4 0.1 - 1.3 K/UL    ABS. EOSINOPHILS 0.2 0.0 - 0.8 K/UL    ABS. BASOPHILS 0.1 0.0 - 0.2 K/UL    ABS. IMM.  GRANS. 0.2 0.0 - 0.5 K/UL    RBC COMMENTS SLIGHT  ANISOCYTOSIS + POIKILOCYTOSIS        WBC COMMENTS Result Confirmed By Smear      PLATELET COMMENTS OCCASIONAL      DF AUTOMATED     PTT    Collection Time: 11/03/20  6:14 PM   Result Value Ref Range    aPTT 31.2 24.1 - 35.1 SEC   PROTHROMBIN TIME + INR    Collection Time: 11/03/20  6:14 PM   Result Value Ref Range    Prothrombin time 13.7 12.5 - 14.7 sec    INR 1.0     LACTIC ACID    Collection Time: 11/03/20  6:14 PM   Result Value Ref Range    Lactic acid 3.1 (H) 0.4 - 2.0 MMOL/L   TSH 3RD GENERATION    Collection Time: 11/03/20  6:14 PM   Result Value Ref Range    TSH 3.120 uIU/mL   D DIMER    Collection Time: 11/03/20  6:14 PM   Result Value Ref Range    D DIMER 3.53 (H) <0.56 ug/ml(FEU)   METABOLIC PANEL, COMPREHENSIVE    Collection Time: 11/03/20  7:25 PM   Result Value Ref Range    Sodium 138 136 - 145 mmol/L    Potassium 4.1 3.5 - 5.1 mmol/L    Chloride 105 98 - 107 mmol/L    CO2 20 (L) 21 - 32 mmol/L    Anion gap 13 7 - 16 mmol/L    Glucose 116 (H) 65 - 100 mg/dL    BUN 42 (H) 6 - 23 MG/DL    Creatinine 2.11 (H) 0.6 - 1.0 MG/DL    GFR est AA 31 (L) >60 ml/min/1.73m2    GFR est non-AA 26 (L) >60 ml/min/1.73m2    Calcium 8.7 8.3 - 10.4 MG/DL    Bilirubin, total 0.6 0.2 - 1.1 MG/DL    ALT (SGPT) 101 (H) 12 - 65 U/L    AST (SGOT) 256 (H) 15 - 37 U/L    Alk. phosphatase 86 50 - 136 U/L    Protein, total 9.1 (H) 6.3 - 8.2 g/dL    Albumin 2.9 (L) 3.5 - 5.0 g/dL    Globulin 6.2 (H) 2.3 - 3.5 g/dL    A-G Ratio 0.5 (L) 1.2 - 3.5     TROPONIN-HIGH SENSITIVITY    Collection Time: 11/03/20  7:25 PM   Result Value Ref Range    Troponin-High Sensitivity 22.1 (H) 0 - 14 pg/mL   TROPONIN-HIGH SENSITIVITY    Collection Time: 11/03/20  9:09 PM   Result Value Ref Range    Troponin-High Sensitivity 19.3 (H) 0 - 14 pg/mL       Ct Chest W Cont    Result Date: 11/3/2020  CT chest with contrast (pulmonary embolism protocol) History: sob, +d dimer, tachycardic. Intermittent hemoptysis. No reported fever. Technique: Helically acquired images were obtained from the lung apices to the domes of the diaphragms reconstructed at 2.5mm intervals after the uneventful administration of 100 cc's intravenous Isovue-370 in order to evaluate the pulmonary arteries. Coronal reformatted images were submitted. Radiation dose reduction techniques were used for this study:  Our CT scanners use one or all of the following: Automated exposure control, adjustment of the mA and/or kVp according to patient's size, iterative reconstruction. Comparison: None. Findings: There is no pleural or pericardial effusion. There are no filling defects within the pulmonary arteries to suggest pulmonary emboli. Peripheral predominant interstitial opacities are present bilaterally to a mild extent. No adenopathy is present within the thorax. Imaging of the upper abdomen reveals the patient is status post cholecystectomy. IMPRESSION: 1. No evidence of pulmonary embolism. 2. Mild patchy peripheral interstitial opacities.  These findings suggest an infectious/inflammatory process which would include viral pneumonitis. Xr Chest Port    Result Date: 11/3/2020  Portable chest: History: Productive cough Comparison: 10/25/2019 Findings: A single view of the chest was obtained at 1752 hours. The cardiac silhouette is enlarged, unchanged. The lungs and pleural spaces are grossly clear. The pulmonary vascularity is within normal limits. Impression: Enlarged cardiac silhouette with grossly clear lungs. Medications   sodium chloride 0.9 % bolus infusion 1,000 mL (has no administration in time range)   sodium chloride 0.9 % bolus infusion 1,000 mL (has no administration in time range)   sodium chloride 0.9 % bolus infusion 500 mL (0 mL IntraVENous IV Completed 11/3/20 2110)         Disposition  I assumed care of the patient from Dr. Georgie Pacheco checkout. Plan was to follow-up on CT PE.  CT PE negative for PE but does have mild peripheral interstitial opacities. I reevaluated the patient. She has unlabored breathing with oxygen saturations of 95 to 96%. She denies anosmia or aguesia or known Covid exposures but certainly this is a consideration given her CT PE findings. Also reviewed that patient has a new ANDRES with creatinine of 2.11. She is feeling improved after IV fluids and we will give her a bit more. She was on Bactrim recently for UTI so this may account for ANDRES as well. Her troponin was initially slightly elevated although likely related to renal function abnormality. Second troponin improved. Given her pneumonia and ANDRES, I discussed admission but patient prefers to go home after some IV fluids. I do think she needs a Covid swab prior to discharge. We will discharge her on doxycycline and encourage fluid push. I strongly recommended follow-up within 1 to 2 days with patient's PCP. I also gave her extensive return precautions. Patient verbalized understanding. Please note, this chart was dictated using Dragon dictation, voice recognition software.   While efforts were made to correct any transcription errors, some may inadvertently remain. Please forgive punctuation and typographic/voice recognition errors. Please contact me with any questions concerns or for clarification of documentation.

## 2020-11-05 ENCOUNTER — PATIENT OUTREACH (OUTPATIENT)
Dept: CASE MANAGEMENT | Age: 55
End: 2020-11-05

## 2020-11-05 NOTE — PROGRESS NOTES
Early am call from pt. RNCM returned call. Pt reports she \"should have stayed at the hospital\". \"I'm not doing well this morning\". C/o feeling unsteady with walking, some SOB, feeling \"hot\". No other symptoms. Pt is active w/ [facility]. Noted COVID test negative and pt updated on results. Strongly encouraged pt to call MD for appmt today or go back to ED w/ worsening/ emergency symptoms. Also encouraged to call family to come stay with her as she no longer needs to isolate. Appreciative of help, plan to f/u.

## 2020-11-14 ENCOUNTER — HOSPITAL ENCOUNTER (EMERGENCY)
Age: 55
Discharge: HOME | End: 2020-11-14
Attending: EMERGENCY MEDICINE
Payer: COMMERCIAL

## 2020-11-14 VITALS
HEART RATE: 99 BPM | RESPIRATION RATE: 20 BRPM | OXYGEN SATURATION: 99 % | SYSTOLIC BLOOD PRESSURE: 126 MMHG | HEIGHT: 65 IN | WEIGHT: 235 LBS | DIASTOLIC BLOOD PRESSURE: 76 MMHG | BODY MASS INDEX: 39.15 KG/M2 | TEMPERATURE: 98.5 F

## 2020-11-14 DIAGNOSIS — T50.905A ADVERSE EFFECT OF DRUG, INITIAL ENCOUNTER: ICD-10-CM

## 2020-11-14 DIAGNOSIS — B37.0 THRUSH: Primary | ICD-10-CM

## 2020-11-14 PROCEDURE — 99282 EMERGENCY DEPT VISIT SF MDM: CPT

## 2020-11-14 RX ORDER — FLUCONAZOLE 150 MG/1
150 TABLET ORAL
Qty: 14 TAB | Refills: 0 | Status: SHIPPED | OUTPATIENT
Start: 2020-11-14 | End: 2020-11-14

## 2020-11-14 RX ORDER — HYDROCORTISONE 25 MG/ML
LOTION TOPICAL 2 TIMES DAILY
Qty: 60 ML | Refills: 0 | Status: SHIPPED | OUTPATIENT
Start: 2020-11-14 | End: 2020-11-21
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
## 2020-11-14 NOTE — DISCHARGE INSTRUCTIONS
Stop taking your Levaquin as this could be causing your skin problems. Return to the emergency department if your symptoms worsen.

## 2020-11-14 NOTE — ED NOTES
I have reviewed discharge instructions with the patient. The patient verbalized understanding. Patient left ED via Discharge Method: wheelchair to Home with self    Opportunity for questions and clarification provided. Patient given 2 scripts. To continue your aftercare when you leave the hospital, you may receive an automated call from our care team to check in on how you are doing. This is a free service and part of our promise to provide the best care and service to meet your aftercare needs.  If you have questions, or wish to unsubscribe from this service please call [phone]. Thank you for Choosing our [facility] Emergency Department.

## 2020-12-28 ENCOUNTER — HOSPITAL ENCOUNTER (OUTPATIENT)
Dept: MAMMOGRAPHY | Age: 55
Discharge: HOME | End: 2020-12-28
Attending: FAMILY MEDICINE
Payer: COMMERCIAL

## 2020-12-28 DIAGNOSIS — Z12.31 SCREENING MAMMOGRAM, ENCOUNTER FOR: ICD-10-CM

## 2020-12-28 PROCEDURE — 77067 SCR MAMMO BI INCL CAD: CPT

## 2020-12-29 NOTE — PROGRESS NOTES
No mammographic evidence of malignancy. Recommend annual mammogram in 1 year. They suggest supplemental screening breast MRI because of her dense breast tissue and elevated risk with the family history of breast cancer.   Please ask patient and let me know if she would be interested in doing an MRI

## 2021-05-03 PROBLEM — E53.8 FOLATE DEFICIENCY: Status: ACTIVE | Noted: 2021-05-03

## 2021-08-03 PROBLEM — E66.01 OBESITY, MORBID (HCC): Status: RESOLVED | Noted: 2019-04-02 | Resolved: 2021-08-03

## 2021-10-21 ENCOUNTER — TRANSCRIBE ORDER (OUTPATIENT)
Dept: REGISTRATION | Age: 56
End: 2021-10-21

## 2021-10-21 DIAGNOSIS — Z12.31 SCREENING MAMMOGRAM FOR HIGH-RISK PATIENT: Primary | ICD-10-CM

## 2021-11-21 ENCOUNTER — HOSPITAL ENCOUNTER (EMERGENCY)
Age: 56
Discharge: HOME | End: 2021-11-21
Attending: EMERGENCY MEDICINE
Payer: COMMERCIAL

## 2021-11-21 ENCOUNTER — APPOINTMENT (OUTPATIENT)
Dept: GENERAL RADIOLOGY | Age: 56
End: 2021-11-21
Attending: EMERGENCY MEDICINE
Payer: COMMERCIAL

## 2021-11-21 VITALS
WEIGHT: 230 LBS | DIASTOLIC BLOOD PRESSURE: 92 MMHG | OXYGEN SATURATION: 100 % | HEART RATE: 93 BPM | RESPIRATION RATE: 16 BRPM | SYSTOLIC BLOOD PRESSURE: 156 MMHG | HEIGHT: 65 IN | BODY MASS INDEX: 38.32 KG/M2 | TEMPERATURE: 97.9 F
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
## 2021-11-21 PROCEDURE — 99284 EMERGENCY DEPT VISIT MOD MDM: CPT

## 2021-11-21 PROCEDURE — 73090 X-RAY EXAM OF FOREARM: CPT

## 2021-11-21 PROCEDURE — 74011250637 HC RX REV CODE- 250/637: Performed by: NURSE PRACTITIONER

## 2021-11-21 PROCEDURE — 73130 X-RAY EXAM OF HAND: CPT

## 2021-11-21 RX ORDER — METHOCARBAMOL 500 MG/1
1000 TABLET, FILM COATED ORAL
Status: COMPLETED | OUTPATIENT
Start: 2021-11-21 | End: 2021-11-21

## 2021-11-21 RX ORDER — IBUPROFEN 800 MG/1
800 TABLET ORAL
Qty: 20 TABLET | Refills: 0 | Status: SHIPPED | OUTPATIENT
Start: 2021-11-21 | End: 2021-11-28

## 2021-11-21 RX ORDER — IBUPROFEN 800 MG/1
800 TABLET ORAL
Status: COMPLETED | OUTPATIENT
Start: 2021-11-21 | End: 2021-11-21

## 2021-11-21 RX ORDER — METHOCARBAMOL 750 MG/1
750 TABLET, FILM COATED ORAL
Qty: 30 TABLET | Refills: 0 | Status: SHIPPED | OUTPATIENT
Start: 2021-11-21 | End: 2021-12-01

## 2021-11-21 RX ADMIN — METHOCARBAMOL 1000 MG: 500 TABLET ORAL at 14:59

## 2021-11-21 RX ADMIN — IBUPROFEN 800 MG: 800 TABLET, FILM COATED ORAL at 14:59
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
## 2021-11-21 NOTE — Clinical Note
[id number] [address] EMERGENCY DEPT  [address] [facility] [address] [phone]    Work/School Note    Date: 11/21/2021    To Whom It May concern:    Delmar Nj was seen and treated today in the emergency room by the following provider(s):  Attending Provider: Jacqueline So DO  Nurse Practitioner: Shaw Smith NP. Delmar Nj is excused from work/school on 11/21/21 and 11/22/21. She is medically clear to return to work/school on 11/23/2021.        Sincerely,          Fabrice Ascencio RN
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
## 2021-11-21 NOTE — Clinical Note
[address] [address] EMERGENCY DEPT  [address] [address] [phone]    Work/School Note    Date: 11/21/2021    To Whom It May concern:    Jacky Powers was seen and treated today in the emergency room by the following provider(s):  Attending Provider: Tania Vaughan DO  Nurse Practitioner: Paige Perkins NP. Jacky Powers is excused from work/school on 11/21/21 and 11/22/21. She is medically clear to return to work/school on 11/23/2021.        Sincerely,          Christopher Trejo NP

## 2021-11-21 NOTE — ED NOTES
I have reviewed discharge instructions with the patient. The patient verbalized understanding. Patient left ED via Discharge Method: ambulatory to Home with family  Opportunity for questions and clarification provided. Patient given 2 scripts. To continue your aftercare when you leave the hospital, you may receive an automated call from our care team to check in on how you are doing. This is a free service and part of our promise to provide the best care and service to meet your aftercare needs.  If you have questions, or wish to unsubscribe from this service please call [phone]. Thank you for Choosing our [facility] Emergency Department.

## 2021-11-21 NOTE — DISCHARGE INSTRUCTIONS
Take medication as prescribed. Perform frequent, gentle stretching of your neck and back. Apply ice to painful areas for the first 24 hours. Beginning tomorrow, alternate using ice application and heat application for 10 to 20 minutes every 1-2 hours to help alleviate pain. Massage painful areas using over-the-counter muscle rub to help alleviate pain. Return to the emergency department for any new, worsening, or concerning symptoms.

## 2021-12-29 ENCOUNTER — HOSPITAL ENCOUNTER (OUTPATIENT)
Dept: MAMMOGRAPHY | Age: 56
Discharge: HOME | End: 2021-12-29
Attending: FAMILY MEDICINE
Payer: COMMERCIAL

## 2021-12-29 DIAGNOSIS — Z12.31 SCREENING MAMMOGRAM FOR HIGH-RISK PATIENT: ICD-10-CM

## 2021-12-29 PROCEDURE — 77067 SCR MAMMO BI INCL CAD: CPT

## 2022-03-18 PROBLEM — R93.1 AGATSTON CORONARY ARTERY CALCIUM SCORE BETWEEN 200 AND 399: Status: ACTIVE | Noted: 2019-12-02

## 2022-03-18 PROBLEM — E66.01 MORBID OBESITY (HCC): Status: ACTIVE | Noted: 2019-04-29

## 2022-03-19 PROBLEM — F33.2 DEPRESSION, MAJOR, SEVERE RECURRENCE (HCC): Status: ACTIVE | Noted: 2019-12-12

## 2022-03-19 PROBLEM — E53.8 FOLATE DEFICIENCY: Status: ACTIVE | Noted: 2021-05-03

## 2022-03-19 PROBLEM — D61.9 BONE MARROW HYPOCELLULARITY (HCC): Status: ACTIVE | Noted: 2017-08-16

## 2022-03-19 PROBLEM — F41.9 ANXIETY: Status: ACTIVE | Noted: 2020-06-10

## 2022-03-19 PROBLEM — R00.2 PALPITATIONS: Status: ACTIVE | Noted: 2019-10-31

## 2022-03-19 PROBLEM — R07.89 CHEST DISCOMFORT: Status: ACTIVE | Noted: 2019-10-31

## 2022-03-19 PROBLEM — F51.04 PSYCHOPHYSIOLOGICAL INSOMNIA: Status: ACTIVE | Noted: 2020-06-10

## 2022-03-20 PROBLEM — F43.21 GRIEF AT LOSS OF CHILD: Status: ACTIVE | Noted: 2020-06-10

## 2022-03-20 PROBLEM — Z63.4 GRIEF AT LOSS OF CHILD: Status: ACTIVE | Noted: 2020-06-10

## 2022-03-20 PROBLEM — R03.0 BLOOD PRESSURE ELEVATED WITHOUT HISTORY OF HTN: Status: ACTIVE | Noted: 2019-04-29

## 2022-11-07 ENCOUNTER — APPOINTMENT (OUTPATIENT)
Dept: GENERAL RADIOLOGY | Age: 57
End: 2022-11-07
Payer: COMMERCIAL

## 2022-11-07 ENCOUNTER — HOSPITAL ENCOUNTER (EMERGENCY)
Age: 57
Discharge: HOME | End: 2022-11-07
Attending: EMERGENCY MEDICINE
Payer: COMMERCIAL

## 2022-11-07 VITALS
HEART RATE: 91 BPM | SYSTOLIC BLOOD PRESSURE: 141 MMHG | BODY MASS INDEX: 34.15 KG/M2 | WEIGHT: 200 LBS | RESPIRATION RATE: 19 BRPM | OXYGEN SATURATION: 99 % | DIASTOLIC BLOOD PRESSURE: 97 MMHG | TEMPERATURE: 98 F | HEIGHT: 64 IN

## 2022-11-07 DIAGNOSIS — J18.9 PNEUMONIA DUE TO INFECTIOUS ORGANISM, UNSPECIFIED LATERALITY, UNSPECIFIED PART OF LUNG: Primary | ICD-10-CM

## 2022-11-07 DIAGNOSIS — R05.1 ACUTE COUGH: ICD-10-CM

## 2022-11-07 LAB
FLUAV AG NPH QL IA: NEGATIVE
FLUBV AG NPH QL IA: NEGATIVE
SARS-COV-2 RDRP RESP QL NAA+PROBE: NOT DETECTED
SOURCE: NORMAL
SPECIMEN SOURCE: NORMAL

## 2022-11-07 PROCEDURE — 87635 SARS-COV-2 COVID-19 AMP PRB: CPT

## 2022-11-07 PROCEDURE — 71046 X-RAY EXAM CHEST 2 VIEWS: CPT

## 2022-11-07 PROCEDURE — 87804 INFLUENZA ASSAY W/OPTIC: CPT

## 2022-11-07 PROCEDURE — 99284 EMERGENCY DEPT VISIT MOD MDM: CPT

## 2022-11-07 RX ORDER — METFORMIN HYDROCHLORIDE 500 MG/1
500 TABLET, EXTENDED RELEASE ORAL DAILY
COMMUNITY
Start: 2022-05-27

## 2022-11-07 RX ORDER — TRIAMCINOLONE ACETONIDE 10 MG/ML
5 INJECTION, SUSPENSION INTRA-ARTICULAR; INTRALESIONAL ONCE
Qty: 0.5 ML | Refills: 0 | Status: SHIPPED | OUTPATIENT
Start: 2022-11-07 | End: 2022-11-07

## 2022-11-07 RX ORDER — ROSUVASTATIN CALCIUM 20 MG/1
20 TABLET, COATED ORAL
COMMUNITY
Start: 2021-11-12

## 2022-11-07 RX ORDER — BENZONATATE 200 MG/1
200 CAPSULE ORAL 3 TIMES DAILY PRN
Qty: 21 CAPSULE | Refills: 0 | Status: SHIPPED | OUTPATIENT
Start: 2022-11-07 | End: 2022-11-14

## 2022-11-07 RX ORDER — AZITHROMYCIN 250 MG/1
TABLET, FILM COATED ORAL
Qty: 1 PACKET | Refills: 0 | Status: SHIPPED | OUTPATIENT
Start: 2022-11-07 | End: 2022-11-11

## 2022-11-07 ASSESSMENT — ENCOUNTER SYMPTOMS
DIARRHEA: 0
NAUSEA: 0
SINUS PAIN: 0
CHEST TIGHTNESS: 0
SHORTNESS OF BREATH: 0
RHINORRHEA: 0
COUGH: 1
VOMITING: 1
ABDOMINAL DISTENTION: 0
SORE THROAT: 0

## 2022-11-07 ASSESSMENT — PAIN - FUNCTIONAL ASSESSMENT: PAIN_FUNCTIONAL_ASSESSMENT: 0-10

## 2022-11-07 ASSESSMENT — PAIN SCALES - GENERAL
PAINLEVEL_OUTOF10: 1
PAINLEVEL_OUTOF10: 1

## 2022-11-07 ASSESSMENT — PAIN DESCRIPTION - LOCATION: LOCATION: ABDOMEN

## 2022-11-07 NOTE — ED NOTES
I have reviewed discharge instructions with the patient. The patient verbalized understanding. Patient left ED via Discharge Method: ambulatory to Home with self. Opportunity for questions and clarification provided. Patient given 2 scripts. To continue your aftercare when you leave the hospital, you may receive an automated call from our care team to check in on how you are doing. This is a free service and part of our promise to provide the best care and service to meet your aftercare needs.  If you have questions, or wish to unsubscribe from this service please call [phone]. Thank you for Choosing our [facility] Emergency Department.         Jeannie Albarran RN  11/07/22 3832

## 2022-11-07 NOTE — DISCHARGE INSTRUCTIONS
Please take full course of antibiotics as prescribed, even if you are feeling better. These antibiotics are to treat your cough that has been going on for 2 months. You will also be given a prescription for Tessalon pearls to help decrease your cough. If your symptoms worsen, including productive cough, fever, chills, please follow-up here or with your primary care doctor for further evaluation. Your blood pressure was elevated today. Please make an appointment with your primary care doctor to have further evaluation of the high blood pressure.
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
## 2022-11-07 NOTE — Clinical Note
Stefano Jarrell was seen and treated in our emergency department on 11/7/2022. She may return to work on 11/10/2022. If you have any questions or concerns, please don't hesitate to call.       SUN Campos

## 2022-12-16 ENCOUNTER — TRANSCRIBE ORDERS (OUTPATIENT)
Dept: SCHEDULING | Age: 57
End: 2022-12-16

## 2022-12-16 DIAGNOSIS — Z12.31 SCREENING MAMMOGRAM FOR HIGH-RISK PATIENT: Primary | ICD-10-CM

## 2023-01-07 ENCOUNTER — HOSPITAL ENCOUNTER (OUTPATIENT)
Dept: MAMMOGRAPHY | Age: 58
End: 2023-01-07
Payer: COMMERCIAL

## 2023-01-07 DIAGNOSIS — Z12.31 SCREENING MAMMOGRAM FOR HIGH-RISK PATIENT: ICD-10-CM

## 2023-01-07 PROCEDURE — 77067 SCR MAMMO BI INCL CAD: CPT

## 2024-10-24 ENCOUNTER — HOSPITAL ENCOUNTER (OUTPATIENT)
Dept: MAMMOGRAPHY | Age: 59
Discharge: HOME | End: 2024-10-27
Payer: COMMERCIAL

## 2024-10-24 DIAGNOSIS — Z12.31 ENCOUNTER FOR SCREENING MAMMOGRAM FOR BREAST CANCER: ICD-10-CM

## 2024-10-24 PROCEDURE — 77063 BREAST TOMOSYNTHESIS BI: CPT

## 2024-10-30 VITALS — WEIGHT: 170 LBS | BODY MASS INDEX: 27.32 KG/M2 | HEIGHT: 66 IN
